# Patient Record
Sex: FEMALE | Race: WHITE | Employment: UNEMPLOYED | URBAN - METROPOLITAN AREA
[De-identification: names, ages, dates, MRNs, and addresses within clinical notes are randomized per-mention and may not be internally consistent; named-entity substitution may affect disease eponyms.]

---

## 2017-10-11 ENCOUNTER — HOSPITAL ENCOUNTER (EMERGENCY)
Age: 27
Discharge: HOME | End: 2017-10-11
Attending: FAMILY MEDICINE

## 2017-10-11 VITALS
HEIGHT: 65 IN | DIASTOLIC BLOOD PRESSURE: 69 MMHG | BODY MASS INDEX: 25.66 KG/M2 | RESPIRATION RATE: 16 BRPM | HEART RATE: 83 BPM | SYSTOLIC BLOOD PRESSURE: 117 MMHG | OXYGEN SATURATION: 100 % | TEMPERATURE: 98.2 F | WEIGHT: 154 LBS

## 2017-10-11 DIAGNOSIS — J20.8 ACUTE VIRAL BRONCHITIS: Primary | ICD-10-CM

## 2017-10-11 RX ORDER — PREDNISONE 10 MG/1
TABLET ORAL
Qty: 21 TAB | Refills: 0 | Status: SHIPPED | OUTPATIENT
Start: 2017-10-11 | End: 2017-12-02

## 2017-10-11 RX ORDER — CODEINE PHOSPHATE AND GUAIFENESIN 10; 100 MG/5ML; MG/5ML
5 SOLUTION ORAL
Qty: 120 ML | Refills: 0 | Status: SHIPPED | OUTPATIENT
Start: 2017-10-11 | End: 2017-12-02

## 2017-10-11 RX ORDER — ALBUTEROL SULFATE 90 UG/1
2 AEROSOL, METERED RESPIRATORY (INHALATION)
Qty: 1 INHALER | Refills: 0 | Status: SHIPPED | OUTPATIENT
Start: 2017-10-11 | End: 2018-03-27

## 2017-10-11 RX ORDER — BENZONATATE 200 MG/1
200 CAPSULE ORAL
Qty: 21 CAP | Refills: 0 | Status: SHIPPED | OUTPATIENT
Start: 2017-10-11 | End: 2017-10-18

## 2017-10-11 NOTE — UC PROVIDER NOTE
Patient is a 32 y.o. female presenting with cough. The history is provided by the patient. Cough   This is a new problem. The current episode started more than 1 week ago. The problem occurs every few minutes. The problem has not changed since onset. The cough is non-productive (increases on talking and worst at bed time). There has been no fever. Pertinent negatives include no chest pain, no chills, no shortness of breath and no wheezing. She has tried nothing for the symptoms. She is not a smoker. Her past medical history is significant for bronchitis. Her past medical history does not include asthma. History reviewed. No pertinent past medical history. Past Surgical History:   Procedure Laterality Date    HX APPENDECTOMY           History reviewed. No pertinent family history. Social History     Social History    Marital status:      Spouse name: N/A    Number of children: N/A    Years of education: N/A     Occupational History    Not on file. Social History Main Topics    Smoking status: Never Smoker    Smokeless tobacco: Never Used    Alcohol use Not on file    Drug use: Not on file    Sexual activity: Not on file     Other Topics Concern    Not on file     Social History Narrative    No narrative on file                ALLERGIES: Review of patient's allergies indicates no known allergies. Review of Systems   Constitutional: Negative for chills. Respiratory: Positive for cough. Negative for shortness of breath and wheezing. Cardiovascular: Negative for chest pain. All other systems reviewed and are negative. Vitals:    10/11/17 1728   BP: 117/69   Pulse: 83   Resp: 16   Temp: 98.2 °F (36.8 °C)   SpO2: 100%   Weight: 69.9 kg (154 lb)   Height: 5' 4.96\" (1.65 m)       Physical Exam   Constitutional: No distress.    HENT:   Right Ear: Tympanic membrane and ear canal normal.   Left Ear: Tympanic membrane and ear canal normal.   Nose: Nose normal.   Mouth/Throat: No oropharyngeal exudate, posterior oropharyngeal edema or posterior oropharyngeal erythema. Eyes: Conjunctivae are normal. Right eye exhibits no discharge. Left eye exhibits no discharge. Neck: Neck supple. Pulmonary/Chest: Effort normal. No respiratory distress. She has decreased breath sounds (bronchial ). She has no wheezes. She has no rales. Lymphadenopathy:     She has no cervical adenopathy. Skin: No rash noted. Nursing note and vitals reviewed. MDM     Differential Diagnosis; Clinical Impression; Plan:     CLINICAL IMPRESSION:  Acute viral bronchitis  (primary encounter diagnosis)      DDX    Plan:    Albuterol- tessalon- prednisone- zyrtec and robituss AC  Fluids  follow in 1-2 weeks  Amount and/or Complexity of Data Reviewed:    Review and summarize past medical records:  Yes  Risk of Significant Complications, Morbidity, and/or Mortality:   Presenting problems: Moderate  Management options:   Moderate  Progress:   Patient progress:  Stable      Procedures

## 2017-10-11 NOTE — LETTER
[facility] 
[address] [place] [address] 
[phone] Work/School Note Date: 10/11/2017 To Whom It May concern: 
 
Kylee Gilbert was seen and treated today in the urgent care center by the following provider(s): 
Attending Provider: Dwayne Vaz MD.   
 
Kylee Gilbert may return to work on 10/13/17. Sincerely, Dwayne Vaz MD

## 2017-10-11 NOTE — DISCHARGE INSTRUCTIONS
Bronchitis: Care Instructions  Your Care Instructions    Bronchitis is inflammation of the bronchial tubes, which carry air to the lungs. The tubes swell and produce mucus, or phlegm. The mucus and inflamed bronchial tubes make you cough. You may have trouble breathing. Most cases of bronchitis are caused by viruses like those that cause colds. Antibiotics usually do not help and they may be harmful. Bronchitis usually develops rapidly and lasts about 2 to 3 weeks in otherwise healthy people. Follow-up care is a key part of your treatment and safety. Be sure to make and go to all appointments, and call your doctor if you are having problems. It's also a good idea to know your test results and keep a list of the medicines you take. How can you care for yourself at home? · Take all medicines exactly as prescribed. Call your doctor if you think you are having a problem with your medicine. · Get some extra rest.  · Take an over-the-counter pain medicine, such as acetaminophen (Tylenol), ibuprofen (Advil, Motrin), or naproxen (Aleve) to reduce fever and relieve body aches. Read and follow all instructions on the label. · Do not take two or more pain medicines at the same time unless the doctor told you to. Many pain medicines have acetaminophen, which is Tylenol. Too much acetaminophen (Tylenol) can be harmful. · Take an over-the-counter cough medicine that contains dextromethorphan to help quiet a dry, hacking cough so that you can sleep. Avoid cough medicines that have more than one active ingredient. Read and follow all instructions on the label. · Breathe moist air from a humidifier, hot shower, or sink filled with hot water. The heat and moisture will thin mucus so you can cough it out. · Do not smoke. Smoking can make bronchitis worse. If you need help quitting, talk to your doctor about stop-smoking programs and medicines. These can increase your chances of quitting for good.   When should you call for help? Call 911 anytime you think you may need emergency care. For example, call if:  · You have severe trouble breathing. Call your doctor now or seek immediate medical care if:  · You have new or worse trouble breathing. · You cough up dark brown or bloody mucus (sputum). · You have a new or higher fever. · You have a new rash. Watch closely for changes in your health, and be sure to contact your doctor if:  · You cough more deeply or more often, especially if you notice more mucus or a change in the color of your mucus. · You are not getting better as expected. Where can you learn more? Go to http://tj-michele.info/. Enter H333 in the search box to learn more about \"Bronchitis: Care Instructions. \"  Current as of: March 25, 2017  Content Version: 11.3  © 3325-5236 [employer]. Care instructions adapted under license by [employer] (which disclaims liability or warranty for this information). If you have questions about a medical condition or this instruction, always ask your healthcare professional. [address] any warranty or liability for your use of this information.

## 2017-12-02 ENCOUNTER — HOSPITAL ENCOUNTER (EMERGENCY)
Age: 27
Discharge: HOME | End: 2017-12-02
Attending: PHYSICIAN ASSISTANT

## 2017-12-02 ENCOUNTER — APPOINTMENT (OUTPATIENT)
Dept: GENERAL RADIOLOGY | Age: 27
End: 2017-12-02
Attending: EMERGENCY MEDICINE

## 2017-12-02 VITALS
TEMPERATURE: 97.7 F | WEIGHT: 157 LBS | HEIGHT: 66 IN | OXYGEN SATURATION: 98 % | HEART RATE: 78 BPM | RESPIRATION RATE: 16 BRPM | BODY MASS INDEX: 25.23 KG/M2

## 2017-12-02 DIAGNOSIS — M25.572 ACUTE LEFT ANKLE PAIN: Primary | ICD-10-CM

## 2017-12-02 RX ORDER — IBUPROFEN 400 MG/1
400 TABLET ORAL
Qty: 20 TAB | Refills: 0 | Status: SHIPPED | OUTPATIENT
Start: 2017-12-02

## 2017-12-02 NOTE — UC PROVIDER NOTE
Patient is a 32 y.o. female presenting with foot pain. The history is provided by the patient (pain with prolonged standing and walking for 4-6 months,  No injury. Somtimes it swells but not much.). No  was used. Foot Pain    The current episode started more than 1 week ago. The problem occurs constantly. The problem has not changed since onset. The pain is present in the left ankle. The quality of the pain is described as aching. The pain is moderate. Associated symptoms include stiffness. Pertinent negatives include no numbness and full range of motion. The symptoms are aggravated by standing. She has tried nothing for the symptoms. There has been no history of extremity trauma. History reviewed. No pertinent past medical history. Past Surgical History:   Procedure Laterality Date    HX APPENDECTOMY           History reviewed. No pertinent family history. Social History     Social History    Marital status:      Spouse name: N/A    Number of children: N/A    Years of education: N/A     Occupational History    Not on file. Social History Main Topics    Smoking status: Never Smoker    Smokeless tobacco: Never Used    Alcohol use Not on file    Drug use: Not on file    Sexual activity: Not on file     Other Topics Concern    Not on file     Social History Narrative                ALLERGIES: Review of patient's allergies indicates no known allergies. Review of Systems   Constitutional: Negative. Musculoskeletal: Positive for stiffness. Left ankle pain   Skin: Negative. Neurological: Negative for numbness. Hematological: Negative. Vitals:    12/02/17 1406   Pulse: 78   Resp: 16   Temp: 97.7 °F (36.5 °C)   SpO2: 98%   Weight: 71.2 kg (157 lb)   Height: 5' 6\" (1.676 m)       Physical Exam   Constitutional: She is oriented to person, place, and time. She appears well-developed and well-nourished.    HENT:   Head: Normocephalic and atraumatic. Mouth/Throat: Oropharynx is clear and moist.   Eyes: Conjunctivae and EOM are normal.   Musculoskeletal: Normal range of motion. She exhibits tenderness. She exhibits no edema or deformity. Left ankle: She exhibits normal range of motion, no swelling and no deformity. Tenderness. No lateral malleolus, no medial malleolus, no head of 5th metatarsal and no proximal fibula tenderness found. Achilles tendon normal. Achilles tendon exhibits normal Lopez's test results. Feet:    NVI with palpable distal pedal pulse. Prompt cap refill   Neurological: She is alert and oriented to person, place, and time. Skin: Skin is warm and dry. No rash noted. No erythema. Psychiatric: She has a normal mood and affect. Her behavior is normal. Thought content normal.   Nursing note and vitals reviewed. MDM     Differential Diagnosis; Clinical Impression; Plan:     CLINICAL IMPRESSION:  Acute left ankle pain  (primary encounter diagnosis)    Plan:  1. NSAID  2. Close fu  3. Amount and/or Complexity of Data Reviewed:   Tests in the radiology section of CPT®:  Ordered and reviewed  Risk of Significant Complications, Morbidity, and/or Mortality:   Presenting problems: Moderate  Management options:   Moderate  Progress:   Patient progress:  Stable      Procedures

## 2017-12-02 NOTE — DISCHARGE INSTRUCTIONS
Ãáã ÈÇáÞÏã: ÅÑÔÇÏÇÊ ÇáÑÚÇíÉ  [ Foot Pain: Care Instructions ]  ÅÑÔÇÏÇÊ ÇáÑÚÇíÉ ÇáÎÇÕÉ Èß  Åä ÅÕÇÈÇÊ ÇáÞÏã ÇáÊí ÊõÓÈÈ Ãáã æÊæÑã ÅÕÇÈÇÊ ÔÇÆÚÉ ÊãÇãðÇ. íãßä ÊÞÑíÈðÇ áÌãíÚ ÇáÃáÚÇÈ ÇáÑíÇÖíÉ Ãæ ãÔÇÑíÚ ÇáÅÕáÇÍ ÇáãäÒáíÉ Ãä ÊÊÓÈÈ Ýí ÍÏæË ÒáÉ ÊäÊåí DRUFXIB ÈÃáã ÈÇáÞÏã. ßãÇ íãßä XZTYMYKNO ÇáÚÇÏí¡ ÎÇÕÉ ßáãÇ ÊÞÏãÊ Ýí ÇáÚãÑ¡ Ãä íÓÈÈ Ãáã ÈÇáÞÏã. ÓÊÔÝì ãÚÙã ÅÕÇÈÇÊ ÇáÞÏã ÇáÕÛíÑÉ ãä ÊáÞÇÁ äÝÓåÇ¡ æíßæä ÇáÚáÇÌ ÇáãäÒáí ÚÇÏÉ åæ ÝÞØ ßá ãÇ Úáíß ÝÚáå. ÅÐÇ ÃÕÈÊ ÅÕÇÈÉ ÍÇÏÉ¡ ÝÞÏ ÊÍÊÇÌ áÅÌÑÇÁ ÝÍæÕÇÊ æÚáÇÌ. ÊõÚÏ ÑÚÇíÉ ÇáãÊÇÈÚÉ ÌÒÁðÇ ÃÓÇÓíðÇ Ýí ÚáÇÌß æÓáÇãÊß. ÝÚáíß ÇáÍÑÕ Úáì ÊÑÊíÈ ÌãíÚ ãæÇÚíÏ ÒíÇÑÉ ÇáØÈíÈ URDFVVUWA ÈåÇ¡ EKYTEZBT ÈØÈíÈß ÚäÏ GKGQLBVK ãä Ãí ãÔßáÇÊ. æãä ÇáÌíÏ ÃíÖðÇ Ãä ÊÚÑÝ äÊÇÆÌ VJUOEHZL æßÐáß VJTPRMSO ÈÞÇÆãÉ ÇáÃÏæíÉ ÇáÊí NGVGUCGU. ßíÝ íãßäß ÇáÇÚÊäÇÁ ÈäÝÓß Ýí ADXPSL¿   · ÊäÇæá ÃÏæíÉ ÊÓßíä ÇáÃáã ÈÏÞÉ æÝÞðÇ áÅÑÔÇÏÇÊ ÇáØÈíÈ. o o ÅÐÇ ÃÚØÇß ÇáØÈíÈ ÏæÇÁð ãä ÇáÃÏæíÉ ÇáÊí ÊõÕÑÝ ÈæÕÝÉ ØÈíÉ áÚáÇÌ ÇáÃáã¡ NDYSTSC æÝÞ ÅÑÔÇÏÇÊå. o o ÅÐÇ áã ÊÊäÇæá ÏæÇÁð áÚáÇÌ ÇáÃáã ãä ÇáÃÏæíÉ ÇáÊí ÊõÕÑÝ ÈæÕÝÉ ØÈíÉ¡ ÝÇÓÃá ØÈíÈß ÅÐÇ ßÇä ÈÅãßÇäß ÊäÇæá ÏæÇÁ ãä ÇáÃÏæíÉ ÇáÊí ÊõÕÑÝ ÈÏæä æÕÝÉ ØÈíÉ.  · ÇÓÊÑÍ æÇÍãí ÞÏãß. ÎÐ ÞÓØðÇ ãä ÇáÑÇÍÉ ãä Ãí äÔÇØ ÞÏ íÊÓÈÈ Ýí ÇáÔÚæÑ ÈÃáã.  · íãßäßö æÖÚ ËáÌ Ãæ ßãÇÏÉ ÈÇÑÏÉ Úáì ÞÏãß áãÏÉ ÊÊÑÇæÍ ãä 10 ÏÞÇÆÞ Åáì 20 ÏÞíÞÉ Ýí ÇáãÑÉ ÇáæÇÍÏÉ. Henretta Aubrey ÞãÇÔ ÑÞíÞÉ Èíä ÇáËáÌ æÌáÏß.  · ÝÃÓäÏ ÇáÞÏã SJPJMGJEP Úáì æÓÇÏÉ Ýí Ãí æÞÊ ÊÌáÓ Ãæ ÊÑÞÏ Ýíå ÎáÇá VJZWCLW ÃíÇã ÇáÞÇÏãÉ. [place] [employer] ÅÈÞÇÁ ÇáãäØÞÉ PYULICQJV Ýí ãÓÊæì ÃÚáì ãä ãÓÊæì ÇáÞáÈ. ÝåÐÇ ãä ÔÃäå Ãä íÓÇÚÏ Úáì ÊÞáíá ÇáÊæÑã.  · ÞÏ íæÕí ØÈíÈß ÈÃä ÊáÝ ÞÏãß ÈÖãÇÏÉ ãÑäÉ. ÇÈÞö Úáì ÞÏãß ãáÝæÝÉ áÝÊÑÉ ØÇáãÇ æÕÝåÇ ÇáØÈíÈ.  · ÅÐÇ ÃæÕì ÇáØÈíÈ ÈÇÓÊÎÏÇã ÚßÇßíÒ¡ STOZRRDUV æÝÞðÇ ááÊæÌíåÇÊ.  · ÇÑÊÏí ÍÐÇÁ æÇÓÚ.  · Ýí ÃÞÑÈ æÞÊ íäÊåí ÇáÃáã æÇáæÑã¡ ÇÈÏÁ Ýí ããÇÑÓÉ ÊãÇÑíä ÑíÇÖíÉ ÎÝíÝÉ áÞÏãß. íãßä Ãä íÎÈÑß ØÈíÈß Ãí ÇáÊãÇÑíä ÇáÑíÇÖíÉ ÇáãÝíÏÉ áß. ãÊì íäÈÛí áß XZWIOET áØáÈ ÇáãÓÇÚÏÉ¿  íÊã LAJHAOL ÈÇáÑÞã 911 ÚäÏ ÇáÇÚÊÞÇÏ Ãäß ÈÍÇÌÉ Åáì ÑÚÇíÉ ØÇÑÆÉ.  Úáì ÓÈíá RLBCOA¡ ÇÊÕá Ýí FKXVDEJ ÇáÊÇáíÉ:   · ÊÍæá áæä ÞÏãß ááæä ÔÇÍÈ Ãæ ÇÈíÖ Ãæ ÃÒÑÞ Ãæ LWNSW ÈÇÑÏÉ. Úáíß ÇáÇÊÕÇá ÈØÈíÈß Úáì ÇáÝæÑ Ãæ ØáÈ ÑÚÇíÉ ØÈíÉ ÝæÑíÉ Ýí YUYUUVD ÇáÊÇáíÉ:   · áÇ íãßäß ÇáÊÍÑß ÈÞÏãß Ãæ ÇáæÞæÝ ÚáíåÇ.  · ÊÈÏæ ÞÏãß ãáÊæíÉ Ãæ ÎÑÌÊ ãä æÖÚåÇ ÇáØÈíÚí.  · áÇ Êßæä ÞÏãß ãÓÊÞÑÉ ÚäÏ ÇáäÒæá.  · ÙåæÑ ÚáÇãÇÊ ÚÏæì Úáíß¡ ãËá:   o o Ãáã ãÊÒÇíÏ Ãæ ÊæÑã Ãæ ÓÎæäÉ Ãæ ÇÍãÑÇÑ. o o ÅÐÇ ÎÑÌÊ ÎØæØ ÍãÑÇÁ ãä ÇáãäØÞÉ RALJVOGK.  o o ÕÏíÏ íÎÑÌ ãä ãßÇä ÈÇáÞÏã. o o ÅÐÇ ÃÕÈÊ MSFBYFM.  · ÊÔÚÑ ÈÇáÎÏÑ Ãæ æÎÒ ÈÞÏãß. Úáíß ãÑÇÞÈÉ Ãí ÊÛíÑÇÊ ÊØÑÃ Úáì ÕÍÊß ÌíÏðÇ¡ æÇáÍÑÕ Úáì ÇáÇÊÕÇá ÈÇáØÈíÈ Ýí HDDFVIV ÇáÊÇáíÉ:   · ÅÐÇ áã ÊÊÍÓä ÍÇáÊß ßãÇ åæ ãÊæÞÚ.  · áÏíß ßÏãÇÊ ãä ÅÕÇÈÉ ÇÓÊãÑÊ ÃßËÑ ãä ÃÓÈæÚíä. Ãíä íãßä ãÚÑÝÉ ÇáãÒíÏ¿  ÇäÊÞÇá Åáì [email or web address] ÏÎæá D999 íãßäß ãÚÑÝÉ ÇáãÒíÏ ãä ÎáÇá ãÑÈÚ ÇáÈÍË \"Ãáã ÈÇáÞÏã: ÅÑÔÇÏÇÊ ÇáÑÚÇíÉ - [ Foot Pain: Care Instructions ]. \"  © 0748-8225 [employer]. ÊãÊ ÊåíÆÉ ÅÑÔÇÏÇÊ ÇáÚäÇíÉ ÈãæÌÈ ÊÑÎíÕ ãä ãÎÊÕ ÇáÑÚÇíÉ ÇáÕÍíÉ áÏíß. ÅÐÇ ßÇäÊ áÏíß ÃíÉ CLQOCUHMW Úä ÍÇáÉ ØÈíÉ Ãæ Ãí ãä åÐå RSWYXSYTL¡ ÝÊæÌå ÏæãðÇ UDCESSQ Åáì ãÎÊÕ ÇáÑÚÇíÉ ÇáÕÍíÉ. ÊäÝí ãäÙãÉ Wauwaa Nati Garcia ÖãÇä Ãæ Georgeana Blinks XKKTYWY åÐå EESMSOMNQ. ÅÕÏÇÑ ÇáãÍÊæì: 11.4 ãÍÏøË ECLNYBAJ ãä: 22 IPHZR ATIYEAT 9203      ÇáÅÌåÇÏ ÇáÚÖáí: ÅÑÔÇÏÇÊ ÇáÑÚÇíÉ  [ Muscle Strain: Care Instructions ]  ÅÑÔÇÏÇÊ ÇáÑÚÇíÉ ÇáÎÇÕÉ Èß  íÍÏË ÇáÅÌåÇÏ ÇáÚÖáí ÚäÏãÇ ÊÑåÞ Ãæ ÊÓÍÈ ÃÍÏ ÇáÚÖáÇÊ. íãßä Ãä íÍÏË Ðáß ÚäÏãÇ ÊãÇÑÓ ÇáÊãÇÑíä ÇáÑíÇÖíÉ Ãæ ÊÑÝÚ ÔíÆðÇ Ãæ ÚäÏãÇ ÊÊÚÑÖ áÍÇÏË. íãßä Ãä ÊÓÇÚÏ ÇáÑÇÍÉ æÇáÑÚÇíÉ ÇáãäÒáíÉ ÇáÃÎÑì Ýí ÔÝÇÁ ÇáÚÖáÉ. ÊõÚÏ ÑÚÇíÉ ÇáãÊÇÈÚÉ ÌÒÁðÇ ÃÓÇÓíðÇ Ýí ÚáÇÌß æÓáÇãÊß. ÝÚáíß ÇáÍÑÕ Úáì ÊÑÊíÈ ÌãíÚ ãæÇÚíÏ ÒíÇÑÉ ÇáØÈíÈ MDAREAPNB ÈåÇ¡ MVMIESZX ÈØÈíÈß ÚäÏ VKJAZNVT ãä Ãí ãÔßáÇÊ. æãä ÇáÌíÏ ÃíÖðÇ Ãä ÊÚÑÝ äÊÇÆÌ AUCCNNVT æßÐáß TOODWNMZ ÈÞÇÆãÉ ÇáÃÏæíÉ ÇáÊí XKLMVLDB. ßíÝ íãßäß ÇáÇÚÊäÇÁ ÈäÝÓß Ýí UKQIBV¿   · ÇÚãá Úáì ÇÓÊÑÎÇÁ XGENKB ÇáãÌåÏÉ. áÇ ÊÖÚ ÚáíåÇ ÔíÆðÇ ËÞíáðÇ áíæã Ãæ ÇËäíä. ÇÓÊÎÏã ÚßÇÒÇÊ Ãæ ãÚáÇÞ áÅÑÇÍÉ ÇáÃØÑÇÝ FXTMDIAE ÅÐÇ äÕÍß ØÈíÈß ÈÐáß.    · íãßäßö æÖÚ ËáÌ Ãæ ßãÇÏÉ ÈÇÑÏÉ Úáì VKBFAA ÇáãáÊåÈÉ áãÏÉ ÊÊÑÇæÍ ãä 10 ÏÞÇÆÞ Åáì 20 ÏÞíÞÉ Ýí ÇáãÑÉ ÇáæÇÍÏÉ áÅíÞÇÝ ÇáÊæÑã. ÖÚ ÞØÚÉ ÞãÇÔ ÑÞíÞÉ Èíä ßãÇÏÉ ÇáËáÌ æÌáÏß.  · Þã ÈÓäÏ ÇáÐÑÇÚ Ãæ ÇáÓÇÞ ÇáãáÊåÈ Úáì æÓÇÏÉ ÚäÏãÇ ÊÞæã ÈÚãá ßãÇÏÇÊ ËáÌ Ãæ Ýí Ãí æÞÊ Êßæä VDWXXI Ãæ EWPIXW ÎáÇá PHPPTMH ÃíÇã ÇáÊÇáíÉ. Ag Ed ÅÈÞÇÁ ÇáãäØÞÉ LCQCKDSKJ Ýí ãÓÊæì ÃÚáì ãä ãÓÊæì ÇáÞáÈ. ÝåÐÇ ãä ÔÃäå Ãä íÓÇÚÏ Úáì ÊÞáíá ÇáÊæÑã.  · ÊäÇæá ÃÏæíÉ ÊÓßíä ÇáÃáã ÈÏÞÉ æÝÞðÇ áÅÑÔÇÏÇÊ ÇáØÈíÈ. o o ÅÐÇ ÃÚØÇß ÇáØÈíÈ ÏæÇÁð ãä ÇáÃÏæíÉ ÇáÊí ÊõÕÑÝ ÈæÕÝÉ ØÈíÉ áÚáÇÌ ÇáÃáã¡ FZJGBVS æÝÞ ÅÑÔÇÏÇÊå. o o ÅÐÇ áã ÊÊäÇæá ÏæÇÁð áÚáÇÌ ÇáÃáã ãä ÇáÃÏæíÉ ÇáÊí ÊõÕÑÝ ÈæÕÝÉ ØÈíÉ¡ ÝÇÓÃá ØÈíÈß ÅÐÇ ßÇä ÈÅãßÇäß ÊäÇæá ÏæÇÁ ãä ÇáÃÏæíÉ ÇáÊí ÊõÕÑÝ ÈÏæä æÕÝÉ ØÈíÉ.  · ßãÇ íäÈÛí ÊÌäøÈ ããÇÑÓÉ Ãíø ÔíÁ íÒíÏ ãä ÊÝÇÞã ÇáÃáã. ÚÇæÏ ããÇÑÓÉ ÇáÊãÇÑíä ÈÇáÊÏÑíÌ ßáãÇ ÊÔÚÑ ÈÇáÊÍÓä. ãÊì íäÈÛí áß DUQOKNT áØáÈ ÇáãÓÇÚÏÉ¿  Úáíß ÇáÇÊÕÇá ÈØÈíÈß Úáì ÇáÝæÑ Ãæ ØáÈ ÑÚÇíÉ ØÈíÉ ÝæÑíÉ Ýí ÇáÍÇáÇÊ ÇáÊÇáíÉ:   · ÅÐÇ ÃÕÈÊ ÈÃáã ÌÏíÏ æÍÇÏ Ýí ÇáÈØä.  · ÅÐÇ ÃÕÈÍ ÇáØÑÝ ÇáãÕÇÈ ÈÇÑÏðÇ Ãæ ÔÇÍÈðÇ Ãæ ÊÛíÑ áæäå.  · ÅÐÇ ßäÊ ÊÚÇäí ãä Êäãíá¡ Ãæ ÖÚÝ¡ Ãæ ÊÎÏÑ Ýí ÇáØÑÝ ÇáãÕÇÈ.  · ÅÐÇ áã íßä ÈÅãßÇä ÊÍÑíß ÇáãäØÞÉ ÇáãÕÇÈÉ. Úáíß ãÑÇÞÈÉ Ãí ÊÛíÑÇÊ ÊØÑÃ Úáì ÕÍÊß ÌíÏðÇ¡ æÇáÍÑÕ Úáì ÇáÇÊÕÇá ÈÇáØÈíÈ Ýí SCFJCAC ÇáÊÇáíÉ:   · ÅÐÇ áã íßä ÈÅãßÇäß ÇáÊÍãíá Úáì ÃÍÏ ONXQUHC¡ Ãæ ÊÔÚÑ Ãäå ÛíÑ ãÓÊÞÑ ÚäÏ ÇáÓíÑ.  · ÅÐÇ ÇÒÏÇÏ ÇáÃáã æÇáÊæÑã Ãæ áã íÈÏÁÇ Ýí ÇáÊÍÓä ÈÚÏ íæãíä ãä ÇáÑÚÇíÉ ÇáãäÒáíÉ. Ãíä íãßä ãÚÑÝÉ ÇáãÒíÏ¿  ÇäÊÞÇá Åáì [email or web address] ÏÎæá Colmillo.[place]onia íãßäß ãÚÑÝÉ ÇáãÒíÏ ãä ZBBM ãÑÈÚ ÇáÈÍË \"ÇáÅÌåÇÏ ÇáÚÖáí: ÅÑÔÇÏÇÊ ÇáÑÚÇíÉ - [ Muscle Strain: Care Instructions ]. \"  © 3830-0574 Healthwise, [employer]. ÊãÊ ÊåíÆÉ ÅÑÔÇÏÇÊ ÇáÚäÇíÉ ÈãæÌÈ ÊÑÎíÕ ãä ãÎÊÕ ÇáÑÚÇíÉ ÇáÕÍíÉ áÏíß. ÅÐÇ ßÇäÊ áÏíß ÃíÉ GAIKVOXBG Úä ÍÇáÉ ØÈíÉ Ãæ Ãí ãä åÐå JSFVOCZBU¡ ÝÊæÌå ÏæãðÇ IXEOLOS Åáì ãÎÊÕ ÇáÑÚÇíÉ ÇáÕÍíÉ. ÊäÝí ãäÙãÉ [employer], [employer] ÖãÇä Ãæ Laurann Cost NNPPENK åÐå [id number].   ÅÕÏÇÑ ÇáãÍÊæì: 11.4 ãÍÏøË GULCOHCE ãä: 22 KJIHY [id number]

## 2018-03-27 ENCOUNTER — OFFICE VISIT (OUTPATIENT)
Dept: INTERNAL MEDICINE CLINIC | Age: 28
End: 2018-03-27

## 2018-03-27 VITALS
RESPIRATION RATE: 22 BRPM | HEART RATE: 70 BPM | TEMPERATURE: 98 F | SYSTOLIC BLOOD PRESSURE: 91 MMHG | WEIGHT: 156 LBS | DIASTOLIC BLOOD PRESSURE: 58 MMHG | BODY MASS INDEX: 25.07 KG/M2 | HEIGHT: 66 IN | OXYGEN SATURATION: 98 %

## 2018-03-27 DIAGNOSIS — M25.572 CHRONIC PAIN OF LEFT ANKLE: Primary | ICD-10-CM

## 2018-03-27 DIAGNOSIS — G89.29 CHRONIC PAIN OF LEFT ANKLE: Primary | ICD-10-CM

## 2018-03-27 NOTE — PROGRESS NOTES
Rm#9   Chief Complaint   Patient presents with   [address] [place] [address]     new pt     Ankle Pain     left ankle pain; takes ibuprofen. x1 year      1. Have you been to the ER, urgent care clinic since your last visit? Hospitalized since your last visit? Yes  [facility]    2. Have you seen or consulted any other health care providers outside of the [address] [place] [address] since your last visit? Include any pap smears or colon screening.  Yes lst primary care  was in [address] Maintenance Due   Topic Date Due    DTaP/Tdap/Td series (1 - Tdap) 02/12/2011    PAP AKA CERVICAL CYTOLOGY  02/12/2011    Influenza Age 9 to Adult  08/01/2017     PHQ over the last two weeks 3/27/2018   Little interest or pleasure in doing things Not at all   Feeling down, depressed or hopeless Not at all   Total Score PHQ 2 0     Learning Assessment 3/27/2018   PRIMARY LEARNER Patient   HIGHEST LEVEL OF EDUCATION - PRIMARY LEARNER  4 YEARS OF COLLEGE   BARRIERS PRIMARY LEARNER NONE   CO-LEARNER CAREGIVER No   PRIMARY LANGUAGE OTHER (COMMENT)   LEARNER PREFERENCE PRIMARY DEMONSTRATION   ANSWERED BY self   RELATIONSHIP SELF

## 2018-03-27 NOTE — PROGRESS NOTES
HISTORY OF PRESENT ILLNESS  Roiso Guillory is a 29 y.o. female as a new patient for evaluation of the following  HPI  Left medial ankle pain for 1 year, getting worse. Denies injury or unusual activity. Pain worse with prolong standing and sitting. Evaluated by providers in [address] [place] [address],[address] and native [place]; no abnormal findings,xrays normal     Ibuprofen effective for pain    2 childen, . 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 LNMP February 25. No recent gyn exam or gyn provider    History reviewed. No pertinent past medical history. Past Surgical History:   Procedure Laterality Date    HX APPENDECTOMY         Current Outpatient Prescriptions on File Prior to Visit   Medication Sig Dispense Refill    ibuprofen (MOTRIN) 400 mg tablet Take 1 Tab by mouth every six (6) hours as needed for Pain. 20 Tab 0     No current facility-administered medications on file prior to visit. Family History   Problem Relation Age of Onset    No Known Problems Mother     No Known Problems Father            Review of Systems   Constitutional: Negative. HENT: Negative. Eyes: Negative. Respiratory: Negative. Cardiovascular: Negative. Gastrointestinal: Negative. Genitourinary: Negative. Musculoskeletal: Positive for joint pain. Negative for back pain, falls, myalgias and neck pain. Skin: Negative. Neurological: Negative. Endo/Heme/Allergies: Negative. Negative for environmental allergies. Psychiatric/Behavioral: Negative. Visit Vitals    BP 91/58 (BP 1 Location: Left arm, BP Patient Position: Sitting)    Pulse 70    Temp 98 °F (36.7 °C) (Oral)    Resp 22    Ht 5' 6\" (1.676 m)    Wt 156 lb (70.8 kg)    LMP 02/25/2018 (Exact Date)    SpO2 98%    BMI 25.18 kg/m2     Physical Exam   Constitutional: She is oriented to person, place, and time.  She appears well-developed and well-nourished. No distress. HENT:   Head: Normocephalic and atraumatic. Right Ear: External ear normal.   Left Ear: External ear normal.   Nose: Nose normal.   Mouth/Throat: Oropharynx is clear and moist. No oropharyngeal exudate. Eyes: Conjunctivae are normal. Right eye exhibits no discharge. Left eye exhibits no discharge. Neck: Normal range of motion. Neck supple. No thyromegaly present. Cardiovascular: Normal rate and regular rhythm. Pulmonary/Chest: Effort normal and breath sounds normal.   Abdominal: Soft. Musculoskeletal: She exhibits no edema or deformity. Left ankle: She exhibits decreased range of motion. She exhibits no swelling and no deformity. Tenderness. Medial malleolus tenderness found. Achilles tendon exhibits no pain. Feet:    Lymphadenopathy:     She has no cervical adenopathy. Neurological: She is alert and oriented to person, place, and time. Skin: Skin is warm and dry. No rash noted. She is not diaphoretic. No erythema. No pallor. Psychiatric: She has a normal mood and affect. Her behavior is normal. Judgment and thought content normal.       ASSESSMENT and PLAN    ICD-10-CM ICD-9-CM    1. Chronic pain of left ankle M25.572 719.47 REFERRAL TO PHYSICAL THERAPY    G89.29 338.29      Follow-up Disposition: Not on File  reviewed diet, exercise and weight control  cardiovascular risk and specific lipid/LDL goals reviewed  reviewed medications and side effects in detail    1. Encouraged to continue NSAID sparingly, wear supportive footwear    Patient voices understanding and acceptance of this advice and will call back if any further questions or concerns. An After Visit Summary was printed and given to the patient.

## 2018-03-27 NOTE — MR AVS SNAPSHOT
[address] [place] [address] [address] [place] [address] 
[phone] Patient: Helen Rajan MRN: [id number] HO Visit Information Date & Time Provider Department Dept. Phone Encounter #  
 3/27/2018  3:15 PM Meg[facility] [phone] [id number] Upcoming Health Maintenance Date Due DTaP/Tdap/Td series (1 - Tdap) 2011 PAP AKA CERVICAL CYTOLOGY 2011 Influenza Age 5 to Adult 2017 Allergies as of 3/27/2018  Review Complete On: 3/27/2018 By: Mary Parr NP No Known Allergies Current Immunizations  Never Reviewed No immunizations on file. Not reviewed this visit You Were Diagnosed With   
  
 Codes Comments Chronic pain of left ankle    -  Primary ICD-10-CM: M25.572, Z55.27 ICD-9-CM: 719.47, 338.29 Vitals BP Pulse Temp Resp Height(growth percentile) Weight(growth percentile) 91/58 (BP 1 Location: Left arm, BP Patient Position: Sitting) 70 98 °F (36.7 °C) (Oral) 22 5' 6\" (1.676 m) 156 lb (70.8 kg) LMP SpO2 BMI OB Status Smoking Status 2018 (Exact Date) 98% 25.18 kg/m2 Having regular periods Never Smoker Vitals History BMI and BSA Data Body Mass Index Body Surface Area  
 25.18 kg/m 2 1.82 m 2 Preferred Pharmacy Pharmacy Name Phone [id number] CHRISTEL Enrique/ [facility]- [facility] [phone] Your Updated Medication List  
  
   
This list is accurate as of 3/27/18  3:41 PM.  Always use your most recent med list.  
  
  
  
  
 ibuprofen 400 mg tablet Commonly known as:  MOTRIN Take 1 Tab by mouth every six (6) hours as needed for Pain. We Performed the Following REFERRAL TO PHYSICAL THERAPY [HYB41 Custom] Referral Information Referral ID Referred By Referred To [id number] Alysha Watermanle [id number] [address] [place] [address] Medicine and Physical Therapy [place], [address] [place] [address] Road Phone: [phone] Fax: [phone] Visits Status Start Date End Date 1 New Request 3/27/18 3/27/19 If your referral has a status of pending review or denied, additional information will be sent to support the outcome of this decision. Introducing [facility]! Skinny Bailey introduces [employer] patient portal. Now you can access parts of your medical record, email your doctor's office, and request medication refills online. 1. In your internet browser, go to [email or web address] 2. Click on the First Time User? Click Here link in the Sign In box. You will see the New Member Sign Up page. 3. Enter your Wavecraft Access Code exactly as it appears below. You will not need to use this code after youve completed the sign-up process. If you do not sign up before the expiration date, you must request a new code. · Wavecraft Access Code: [email or web address] Expires: 6/25/2018  3:27 PM 
 
4. Enter the last four digits of your Social Security Number (xxxx) and Date of Birth (mm/dd/yyyy) as indicated and click Submit. You will be taken to the next sign-up page. 5. Create a Wavecraft ID. This will be your Wavecraft login ID and cannot be changed, so think of one that is secure and easy to remember. 6. Create a Wavecraft password. You can change your password at any time. 7. Enter your Password Reset Question and Answer. This can be used at a later time if you forget your password. 8. Enter your e-mail address. You will receive e-mail notification when new information is available in [address] 9. Click Sign Up. You can now view and download portions of your medical record. 10. Click the Download Summary menu link to download a portable copy of your medical information. If you have questions, please visit the Frequently Asked Questions section of the Buzzmov[employer] website. Remember, [employer] is NOT to be used for urgent needs. For medical emergencies, dial 911. Now available from your iPhone and Android! Please provide this summary of care documentation to your next provider. Your primary care clinician is listed as NONE. If you have any questions after today's visit, please call [phone].

## 2019-11-11 NOTE — PROGRESS NOTES
RM 3    Chief Complaint   Patient presents with    Fever     chills, fatigue began sunday while in the hospital delivering her sone     Cough     dry cough at night      1. Have you been to the ER, urgent care clinic since your last visit? Hospitalized since your last visit? [facility] for delivery of baby     2. Have you seen or consulted any other health care providers outside of the [address] [place] [address] since your last visit? Include any pap smears or colon screening.  OBGYN Dr. Jeremías Nguyen Maintenance Due   Topic Date Due    DTaP/Tdap/Td series (1 - Tdap) 02/12/2011    PAP AKA CERVICAL CYTOLOGY  02/12/2011    Influenza Age 9 to Adult  08/01/2019     Patient declines vaccines today     Learning Assessment 3/27/2018   PRIMARY LEARNER Patient   HIGHEST LEVEL OF EDUCATION - PRIMARY LEARNER  4 YEARS OF COLLEGE   BARRIERS PRIMARY LEARNER NONE   CO-LEARNER CAREGIVER No   PRIMARY LANGUAGE OTHER (COMMENT)   LEARNER PREFERENCE PRIMARY DEMONSTRATION   ANSWERED BY self   RELATIONSHIP SELF

## 2019-11-12 ENCOUNTER — OFFICE VISIT (OUTPATIENT)
Dept: INTERNAL MEDICINE CLINIC | Age: 29
End: 2019-11-12

## 2019-11-12 VITALS
HEART RATE: 80 BPM | TEMPERATURE: 97.8 F | BODY MASS INDEX: 29.54 KG/M2 | RESPIRATION RATE: 17 BRPM | DIASTOLIC BLOOD PRESSURE: 69 MMHG | OXYGEN SATURATION: 97 % | HEIGHT: 66 IN | WEIGHT: 183.8 LBS | SYSTOLIC BLOOD PRESSURE: 105 MMHG

## 2019-11-12 DIAGNOSIS — R05.9 COUGH: ICD-10-CM

## 2019-11-12 DIAGNOSIS — R50.9 FEVER, UNSPECIFIED FEVER CAUSE: ICD-10-CM

## 2019-11-12 DIAGNOSIS — J06.9 VIRAL URI WITH COUGH: Primary | ICD-10-CM

## 2019-11-12 RX ORDER — IBUPROFEN 800 MG/1
TABLET ORAL
COMMUNITY
Start: 2019-11-10 | End: 2022-09-13

## 2019-11-12 RX ORDER — GUAIFENESIN 600 MG/1
600 TABLET, EXTENDED RELEASE ORAL 2 TIMES DAILY
Qty: 30 TAB | Refills: 0 | Status: SHIPPED | OUTPATIENT
Start: 2019-11-12 | End: 2022-09-13

## 2019-11-12 NOTE — PATIENT INSTRUCTIONS
Please contact Dr. Viridiana Sears. [facility]) if any temperature over 101F, increasing uterine(abdominal) pain, or other worsening. Continuing to breastfeed is the best protection for your baby. To prevent transmission of infection, be sure to wash your hands and work-surfaces frequently. Viral Respiratory Infection: Care Instructions  Your Care Instructions    Viruses are very small organisms. They grow in number after they enter your body. There are many types that cause different illnesses, such as colds and the mumps. The symptoms of a viral respiratory infection often start quickly. They include a fever, sore throat, and runny nose. You may also just not feel well. Or you may not want to eat much. Most viral respiratory infections are not serious. They usually get better with time and self-care. Antibiotics are not used to treat a viral infection. That's because antibiotics will not help cure a viral illness. In some cases, antiviral medicine can help your body fight a serious viral infection. Follow-up care is a key part of your treatment and safety. Be sure to make and go to all appointments, and call your doctor if you are having problems. It's also a good idea to know your test results and keep a list of the medicines you take. How can you care for yourself at home? · Rest as much as possible until you feel better. · Be safe with medicines. Take your medicine exactly as prescribed. Call your doctor if you think you are having a problem with your medicine. You will get more details on the specific medicine your doctor prescribes. · Take an over-the-counter pain medicine, such as acetaminophen (Tylenol), ibuprofen (Advil, Motrin), or naproxen (Aleve), as needed for pain and fever. Read and follow all instructions on the label. Do not give aspirin to anyone younger than 20. It has been linked to Reye syndrome, a serious illness.   · Drink plenty of fluids, enough so that your urine is light yellow or clear like water. Hot fluids, such as tea or soup, may help relieve congestion in your nose and throat. If you have kidney, heart, or liver disease and have to limit fluids, talk with your doctor before you increase the amount of fluids you drink. · Try to clear mucus from your lungs by breathing deeply and coughing. · Gargle with warm salt water once an hour. This can help reduce swelling and throat pain. Use 1 teaspoon of salt mixed in 1 cup of warm water. · Do not smoke or allow others to smoke around you. If you need help quitting, talk to your doctor about stop-smoking programs and medicines. These can increase your chances of quitting for good. To avoid spreading the virus  · Cough or sneeze into a tissue. Then throw the tissue away. · If you don't have a tissue, use your hand to cover your cough or sneeze. Then clean your hand. You can also cough into your sleeve. · Wash your hands often. Use soap and warm water. Wash for 15 to 20 seconds each time. · If you don't have soap and water near you, you can clean your hands with alcohol wipes or gel. When should you call for help? Call your doctor now or seek immediate medical care if:    · You have a new or higher fever.     · Your fever lasts more than 48 hours.     · You have trouble breathing.     · You have a fever with a stiff neck or a severe headache.     · You are sensitive to light.     · You feel very sleepy or confused.    Watch closely for changes in your health, and be sure to contact your doctor if:    · You do not get better as expected. Where can you learn more? Go to http://tj-michele.info/. Enter K542 in the search box to learn more about \"Viral Respiratory Infection: Care Instructions. \"  Current as of: June 9, 2019  Content Version: 12.2  © 5859-5749 [employer], IDRI ([employer]).  Care instructions adapted under license by [employer] (which disclaims liability or warranty for this information). If you have questions about a medical condition or this instruction, always ask your healthcare professional. [place] [address] any warranty or liability for your use of this information.

## 2019-11-12 NOTE — PROGRESS NOTES
ACUTE VISIT     HPI:   Nikolay Hernandez is a 34 y.o. female, she presents today for:   34year old woman 4 days post partum. Delievered of baby boy, . Since delivery has been feeling hot and cold. Denies abdominal pain. Coughing started  night. Feels a raudel in abdomen   Vaginal bleeding is not too heavy. Notes that her appettite is not good. But no Nausea, no vomitting. When she stands up she feels pain everywhere     ROS: no chills, no nausea, no vomiting, no diarrhea, no dizziness nor lightheadedness with standing. Medications used for acute illness: none    Current Outpatient Medications on File Prior to Visit   Medication Sig    ibuprofen (MOTRIN) 800 mg tablet     ibuprofen (MOTRIN) 400 mg tablet Take 1 Tab by mouth every six (6) hours as needed for Pain. No current facility-administered medications on file prior to visit. No Known Allergies    PMH/PSH/FH: reviewed and updated    Sochx:   reports that she has never smoked. She has never used smokeless tobacco. She reports that she does not drink alcohol or use drugs. PE:  Blood pressure 105/69, pulse 80, temperature 97.8 °F (36.6 °C), temperature source Oral, resp. rate 17, height 5' 6\" (1.676 m), weight 183 lb 12.8 oz (83.4 kg), last menstrual period 2019, SpO2 97 %. Body mass index is 29.67 kg/m². Physical Exam   Constitutional: She is oriented to person, place, and time. She appears well-developed and well-nourished. No distress. HENT:   Head: Normocephalic. Mouth/Throat: Oropharynx is clear and moist.   + congestion   Eyes: Conjunctivae and EOM are normal.   Neck: Neck supple. Cardiovascular: Normal rate, regular rhythm and normal heart sounds. Pulmonary/Chest: Effort normal and breath sounds normal. No respiratory distress. She has no wheezes. She has no rales. + dry cough   Abdominal: Soft. She exhibits no distension. Slight tenderness over uterus, but not painful.     Neurological: She is alert Routing refill request to provider for review/approval because:  Elsy given x1 and patient did not follow up, please advise    Norris Concepcion, RN, BSN                 and oriented to person, place, and time. Skin: Skin is warm and dry. Capillary refill takes less than 2 seconds. Psychiatric: She has a normal mood and affect. Nursing note and vitals reviewed. Labs:  No results found for any visits on 11/12/19. A/P  Felicia Guillory was seen today for had concerns including Fever (chills, fatigue began sunday while in the hospital delivering her sone ) and Cough (dry cough at night ). .  The diagnosis and plan was discussed including:        ICD-10-CM ICD-9-CM    1. Viral URI with cough J06.9 465.9 guaiFENesin ER (MUCINEX) 600 mg ER tablet    B97.89     2. Cough R05 786.2    3. Fever, unspecified fever cause R50.9 780.60    4. Lactating mother Z39.1 V24.1      Viral URI: Discussed supportive care including sleep, fluids, anti-pyretics. Advised to return if any signs of complications including: increased fever, new or worsening headache, change in breathing, or congestion last more than 10 days. Okay to use guaifenesin. Post-partum: with normal vitals and no recent anti-pyretics no sign of sepsis. Patient also without significant signs of uterine pain. Suspect this is a simple viral uri.    -reviewed taking temp at home. disucssed that if true fever over 101F or if uterine pain, than to call gyencolgoist.    - Patient notes overall feels the same and bleeding is similar to prior 2 pregnancies. successfully breastfeeding. Called and advised Dr. [facility] nurse regarding visit. - I advised her to call back or return to office if symptoms worsen/change/persist.  - She was given AVS and expressed understanding with the diagnosis and plan as discussed. Follow-up and Dispositions    · Return if symptoms worsen or fail to improve.

## 2020-11-25 ENCOUNTER — OFFICE VISIT (OUTPATIENT)
Dept: INTERNAL MEDICINE CLINIC | Age: 30
End: 2020-11-25
Payer: COMMERCIAL

## 2020-11-25 ENCOUNTER — TELEPHONE (OUTPATIENT)
Dept: INTERNAL MEDICINE CLINIC | Age: 30
End: 2020-11-25

## 2020-11-25 VITALS
HEIGHT: 66 IN | SYSTOLIC BLOOD PRESSURE: 98 MMHG | OXYGEN SATURATION: 98 % | DIASTOLIC BLOOD PRESSURE: 66 MMHG | RESPIRATION RATE: 18 BRPM | BODY MASS INDEX: 28.12 KG/M2 | HEART RATE: 72 BPM | TEMPERATURE: 98.7 F | WEIGHT: 175 LBS

## 2020-11-25 DIAGNOSIS — M25.532 PAIN, WRIST, LEFT: ICD-10-CM

## 2020-11-25 DIAGNOSIS — M25.572 LEFT ANKLE PAIN, UNSPECIFIED CHRONICITY: ICD-10-CM

## 2020-11-25 DIAGNOSIS — Z97.5 IUD (INTRAUTERINE DEVICE) IN PLACE: ICD-10-CM

## 2020-11-25 DIAGNOSIS — Z00.00 WELL WOMAN EXAM (NO GYNECOLOGICAL EXAM): Primary | ICD-10-CM

## 2020-11-25 PROCEDURE — 99395 PREV VISIT EST AGE 18-39: CPT | Performed by: INTERNAL MEDICINE

## 2020-11-25 NOTE — PROGRESS NOTES
RM # 14  Declined the Flu vaccine    3 most recent PHQ Screens 11/25/2020   Little interest or pleasure in doing things Not at all   Feeling down, depressed, irritable, or hopeless Not at all   Total Score PHQ 2 0       Chief Complaint   Patient presents with    Physical    Ankle Pain     left ankle hurts on excersion       1. Have you been to the ER, urgent care clinic since your last visit? Hospitalized since your last visit? No    2. Have you seen or consulted any other health care providers outside of the [address] [place] [address] since your last visit? Include any pap smears or colon screening. No    Health Maintenance Due   Topic Date Due    DTaP/Tdap/Td series (1 - Tdap) 02/12/2011    PAP AKA CERVICAL CYTOLOGY  02/12/2011    Flu Vaccine (1) 09/01/2020       Learning Assessment 3/27/2018   PRIMARY LEARNER Patient   HIGHEST LEVEL OF EDUCATION - PRIMARY LEARNER  4 YEARS OF COLLEGE   BARRIERS PRIMARY LEARNER NONE   CO-LEARNER CAREGIVER No   PRIMARY LANGUAGE OTHER (COMMENT)   LEARNER PREFERENCE PRIMARY DEMONSTRATION   ANSWERED BY self   RELATIONSHIP SELF           AVS, education, follow up and recommendations provided and addressed with patient.   services used to advise patient no

## 2020-11-25 NOTE — TELEPHONE ENCOUNTER
Request for records faxed to [phone]. Confirmation received. Document placed in bin for centralized scanning.

## 2020-11-25 NOTE — PROGRESS NOTES
Subjective:   27 y.o. female for Well Woman Check. Her gyne and breast care is done elsewhere by her Ob-Gyne physician. Past medical, Social, and Family history reviewed  Medications reviewed and updated. ROS: Feeling generally well. No TIA's or unusual headaches, no dysphagia. No prolonged cough. No dyspnea or chest pain on exertion. No abdominal pain, change in bowel habits, black or bloody stools. No urinary tract symptoms. No new or unusual musculoskeletal symptoms. Specific concerns today:      Left ankle pain with walking, standing - x 3 years  No imaging  No known injury   occassional mild swelling  Feels better when she elevates foot    Left wrist pains at times as well that pt attributes to repetitive activity    Mother has leg pains as well but no clear dx    Sees GYN - PAP 4/3/19 per care everywhere  Dr. Bishop Lama at [facility]  IUD in place      Objective: The patient appears well, alert, oriented x 3, in no distress. There were no vitals taken for this visit. ENT normal.  Neck supple. No adenopathy or thyromegaly. ELENA. Lungs are clear, good air entry, no wheezes, rhonchi or rales. S1 and S2 normal, no murmurs, regular rate and rhythm. Abdomen soft without tenderness, guarding, mass or organomegaly. Extremities show no edema, normal peripheral pulses. Neurological is normal, no focal findings. Breast and Pelvic exams are deferred. Prior labs reviewed. Assessment/Plan:   Well Woman  follow low fat diet, routine labs ordered, call if any problems    ICD-10-CM ICD-9-CM    1. Well woman exam (no gynecological exam)  Z00.00 V70.0 CBC WITH AUTOMATED DIFF      HEMOGLOBIN A1C WITH EAG      VITAMIN D, 25 HYDROXY      LIPID PANEL      METABOLIC PANEL, COMPREHENSIVE      METABOLIC PANEL, COMPREHENSIVE      LIPID PANEL      VITAMIN D, 25 HYDROXY      HEMOGLOBIN A1C WITH EAG      CBC WITH AUTOMATED DIFF    [V70.0]   2.  Left ankle pain, unspecified chronicity  M25.572 719.47 XR ANKLE LT MIN 3 V      C REACTIVE PROTEIN, QT      CK      URIC ACID      TSH 3RD GENERATION      T4, FREE      SED RATE (ESR)      LITTLE, DIRECT, W/REFLEX      RA + CCP ABS      RA + CCP ABS      LITTLE, DIRECT, W/REFLEX      SED RATE (ESR)      T4, FREE      TSH 3RD GENERATION      URIC ACID      CK      C REACTIVE PROTEIN, QT   3. Pain, wrist, left  M25.532 719.43 C REACTIVE PROTEIN, QT      CK      URIC ACID      TSH 3RD GENERATION      T4, FREE      SED RATE (ESR)      LITTLE, DIRECT, W/REFLEX      RA + CCP ABS      RA + CCP ABS      LITTLE, DIRECT, W/REFLEX      SED RATE (ESR)      T4, FREE      TSH 3RD GENERATION      URIC ACID      CK      C REACTIVE PROTEIN, QT   4. IUD (intrauterine device) in place  Z97.5 V45.51      Follow-up and Dispositions    · Return in about 1 year (around 11/25/2021), or if symptoms worsen or fail to improve, for wellness visit. results and schedule of future studies reviewed with patient  reviewed diet, exercise and weight   cardiovascular risk and specific lipid/LDL goals reviewed  reviewed medications and side effects in detail     Left ankle Xrays  Serology screening  Consider rheum or ortho foot referral as indicated.   Call with results  Pt declines flu shot  Get further records from [facility]

## 2020-11-27 DIAGNOSIS — E55.9 VITAMIN D DEFICIENCY: Primary | ICD-10-CM

## 2020-11-27 LAB
25(OH)D3 SERPL-MCNC: <9 NG/ML (ref 30–100)
ALBUMIN SERPL-MCNC: 3.7 G/DL (ref 3.5–5)
ALBUMIN/GLOB SERPL: 1.1 {RATIO} (ref 1.1–2.2)
ALP SERPL-CCNC: 84 U/L (ref 45–117)
ALT SERPL-CCNC: 16 U/L (ref 12–78)
ANA SER QL: NEGATIVE
ANION GAP SERPL CALC-SCNC: 2 MMOL/L (ref 5–15)
AST SERPL-CCNC: 9 U/L (ref 15–37)
BASOPHILS # BLD: 0 K/UL (ref 0–0.1)
BASOPHILS NFR BLD: 1 % (ref 0–1)
BILIRUB SERPL-MCNC: 0.5 MG/DL (ref 0.2–1)
BUN SERPL-MCNC: 11 MG/DL (ref 6–20)
BUN/CREAT SERPL: 20 (ref 12–20)
CALCIUM SERPL-MCNC: 8.5 MG/DL (ref 8.5–10.1)
CCP IGA+IGG SERPL IA-ACNC: 4 UNITS (ref 0–19)
CHLORIDE SERPL-SCNC: 108 MMOL/L (ref 97–108)
CHOLEST SERPL-MCNC: 163 MG/DL
CK SERPL-CCNC: 71 U/L (ref 26–192)
CO2 SERPL-SCNC: 28 MMOL/L (ref 21–32)
CREAT SERPL-MCNC: 0.55 MG/DL (ref 0.55–1.02)
CRP SERPL-MCNC: 0.65 MG/DL (ref 0–0.6)
DIFFERENTIAL METHOD BLD: NORMAL
EOSINOPHIL # BLD: 0.3 K/UL (ref 0–0.4)
EOSINOPHIL NFR BLD: 6 % (ref 0–7)
ERYTHROCYTE [DISTWIDTH] IN BLOOD BY AUTOMATED COUNT: 13 % (ref 11.5–14.5)
ERYTHROCYTE [SEDIMENTATION RATE] IN BLOOD: 18 MM/HR (ref 0–20)
EST. AVERAGE GLUCOSE BLD GHB EST-MCNC: 103 MG/DL
GLOBULIN SER CALC-MCNC: 3.3 G/DL (ref 2–4)
GLUCOSE SERPL-MCNC: 86 MG/DL (ref 65–100)
HBA1C MFR BLD: 5.2 % (ref 4–5.6)
HCT VFR BLD AUTO: 39.5 % (ref 35–47)
HDLC SERPL-MCNC: 58 MG/DL
HDLC SERPL: 2.8 {RATIO} (ref 0–5)
HGB BLD-MCNC: 12.7 G/DL (ref 11.5–16)
IMM GRANULOCYTES # BLD AUTO: 0 K/UL (ref 0–0.04)
IMM GRANULOCYTES NFR BLD AUTO: 0 % (ref 0–0.5)
LDLC SERPL CALC-MCNC: 94.4 MG/DL (ref 0–100)
LIPID PROFILE,FLP: NORMAL
LYMPHOCYTES # BLD: 1.9 K/UL (ref 0.8–3.5)
LYMPHOCYTES NFR BLD: 42 % (ref 12–49)
MCH RBC QN AUTO: 28.3 PG (ref 26–34)
MCHC RBC AUTO-ENTMCNC: 32.2 G/DL (ref 30–36.5)
MCV RBC AUTO: 88.2 FL (ref 80–99)
MONOCYTES # BLD: 0.4 K/UL (ref 0–1)
MONOCYTES NFR BLD: 9 % (ref 5–13)
NEUTS SEG # BLD: 1.9 K/UL (ref 1.8–8)
NEUTS SEG NFR BLD: 42 % (ref 32–75)
NRBC # BLD: 0 K/UL (ref 0–0.01)
NRBC BLD-RTO: 0 PER 100 WBC
PLATELET # BLD AUTO: 250 K/UL (ref 150–400)
PMV BLD AUTO: 11.1 FL (ref 8.9–12.9)
POTASSIUM SERPL-SCNC: 4.1 MMOL/L (ref 3.5–5.1)
PROT SERPL-MCNC: 7 G/DL (ref 6.4–8.2)
RBC # BLD AUTO: 4.48 M/UL (ref 3.8–5.2)
RHEUMATOID FACT SERPL-ACNC: <10 IU/ML (ref 0–13.9)
SODIUM SERPL-SCNC: 138 MMOL/L (ref 136–145)
T4 FREE SERPL-MCNC: 1 NG/DL (ref 0.8–1.5)
TRIGL SERPL-MCNC: 53 MG/DL (ref ?–150)
TSH SERPL DL<=0.05 MIU/L-ACNC: 1.5 UIU/ML (ref 0.36–3.74)
URATE SERPL-MCNC: 3.3 MG/DL (ref 2.6–6)
VLDLC SERPL CALC-MCNC: 10.6 MG/DL
WBC # BLD AUTO: 4.5 K/UL (ref 3.6–11)

## 2020-11-27 RX ORDER — ERGOCALCIFEROL 1.25 MG/1
50000 CAPSULE ORAL
Qty: 12 CAP | Refills: 0 | Status: SHIPPED | OUTPATIENT
Start: 2020-11-27 | End: 2021-02-13

## 2020-11-27 RX ORDER — MELATONIN
1000 DAILY
Qty: 30 TAB | Refills: 11 | Status: SHIPPED | OUTPATIENT
Start: 2020-11-27 | End: 2021-12-10 | Stop reason: SDUPTHER

## 2020-11-27 NOTE — PROGRESS NOTES
Please notify pt of results    Vitamin D is very low. She will need to take a weekly high dosed vitamin D for 12 weeks along with long term daily vitamin D. We should reassess in 3-4 months  This may have something to do with her ankle pain if her bones are weak due to low vitamin D. Encourage her to get the Xrays I ordered.   Other labs are all normal.

## 2020-11-30 ENCOUNTER — HOSPITAL ENCOUNTER (OUTPATIENT)
Dept: GENERAL RADIOLOGY | Age: 30
Discharge: HOME | End: 2020-11-30
Payer: COMMERCIAL

## 2020-11-30 DIAGNOSIS — M25.572 LEFT ANKLE PAIN, UNSPECIFIED CHRONICITY: ICD-10-CM

## 2020-11-30 PROCEDURE — 73610 X-RAY EXAM OF ANKLE: CPT | Performed by: INTERNAL MEDICINE

## 2020-12-05 NOTE — TELEPHONE ENCOUNTER
Letter has been sent:    Normal ankle bones and structure. RECOMMENDATIONS:  Continue with current  medications.   Make an appt to see Dr. Micky Early, foot and ankle specialist, at [place] [address]     Please notify pt of results

## 2020-12-05 NOTE — PROGRESS NOTES
Inform pt of lab results along with providers recommendations. Understanding voiced by pt.  Please review xray results with recommendations

## 2020-12-05 NOTE — TELEPHONE ENCOUNTER
----- Message from [facility] sent at 12/5/2020 12:23 PM EST -----  Inform pt of lab results along with providers recommendations. Understanding voiced by pt.  Please review xray results with recommendations

## 2020-12-08 NOTE — TELEPHONE ENCOUNTER
Left Voice mail advising letter mailed with results and recommendations.  Requested patient contact the office with questions

## 2021-04-22 ENCOUNTER — IMMUNIZATION (OUTPATIENT)
Dept: INTERNAL MEDICINE CLINIC | Age: 31
End: 2021-04-22
Payer: COMMERCIAL

## 2021-04-22 DIAGNOSIS — Z23 ENCOUNTER FOR IMMUNIZATION: Primary | ICD-10-CM

## 2021-04-22 PROCEDURE — 91300 COVID-19, MRNA, LNP-S, PF, 30MCG/0.3ML DOSE(PFIZER): CPT | Performed by: FAMILY MEDICINE

## 2021-04-22 PROCEDURE — 0001A COVID-19, MRNA, LNP-S, PF, 30MCG/0.3ML DOSE(PFIZER): CPT | Performed by: FAMILY MEDICINE

## 2021-05-15 ENCOUNTER — IMMUNIZATION (OUTPATIENT)
Dept: INTERNAL MEDICINE CLINIC | Age: 31
End: 2021-05-15
Payer: COMMERCIAL

## 2021-05-15 DIAGNOSIS — Z23 ENCOUNTER FOR IMMUNIZATION: Primary | ICD-10-CM

## 2021-05-15 PROCEDURE — 0002A COVID-19, MRNA, LNP-S, PF, 30MCG/0.3ML DOSE(PFIZER): CPT | Performed by: FAMILY MEDICINE

## 2021-05-15 PROCEDURE — 91300 COVID-19, MRNA, LNP-S, PF, 30MCG/0.3ML DOSE(PFIZER): CPT | Performed by: FAMILY MEDICINE

## 2021-11-01 ENCOUNTER — NURSE TRIAGE (OUTPATIENT)
Dept: OTHER | Facility: CLINIC | Age: 31
End: 2021-11-01

## 2021-11-01 NOTE — TELEPHONE ENCOUNTER
Reason for Disposition   Constant abdominal pain lasting > 2 hours   Urinating more frequently than usual (i.e., frequency)    Answer Assessment - Initial Assessment Questions  1. LOCATION: \"Where does it hurt? \"     left ribs comes and goes. 2. RADIATION: \"Does the pain shoot anywhere else? \" (e.g., chest, back)  Back    3. ONSET: \"When did the pain begin? \" (e.g., minutes, hours or days ago)        A few weeks    4. SUDDEN: \"Gradual or sudden onset? \"       Sudden    5. PATTERN \"Does the pain come and go, or is it constant? \"     - If constant: \"Is it getting better, staying the same, or worsening? \"       (Note: Constant means the pain never goes away completely; most serious pain is constant and it progresses)      - If intermittent: \"How long does it last?\" \"Do you have pain now? \"      (Note: Intermittent means the pain goes away completely between bouts)  Comes and goes    6. SEVERITY: \"How bad is the pain? \"  (e.g., Scale 1-10; mild, moderate, or severe)     - MILD (1-3): doesn't interfere with normal activities, abdomen soft and not tender to touch      - MODERATE (4-7): interferes with normal activities or awakens from sleep, tender to touch      - SEVERE (8-10): excruciating pain, doubled over, unable to do any normal activities    6 out of 10    7. RECURRENT SYMPTOM: \"Have you ever had this type of abdominal pain before? \" If so, ask: \"When was the last time? \" and \"What happened that time? \"       No    8. AGGRAVATING FACTORS: \"Does anything seem to cause this pain? \" (e.g., foods, stress, alcohol)      9. CARDIAC SYMPTOMS: \"Do you have any of the following symptoms: chest pain, difficulty breathing, sweating, nausea? \"  Shortness of breath    10. OTHER SYMPTOMS: \"Do you have any other symptoms? \" (e.g., fever, vomiting, diarrhea)     Dizziness,    11. PREGNANCY: \"Is there any chance you are pregnant? \" \"When was your last menstrual period? \"       No    Answer Assessment - Initial Assessment Questions  1. SYMPTOM: \"What's the main symptom you're concerned about? \" (e.g., frequency, incontinence)      burring with urination    2. ONSET: \"When did the  *No Answer*  start? \"    2 weeks ago    3. PAIN: \"Is there any pain? \" If so, ask: \"How bad is it? \" (Scale: 1-10; mild, moderate, severe)  Sometimes    4. CAUSE: \"What do you think is causing the symptoms? \"      *No Answer*  5. OTHER SYMPTOMS: \"Do you have any other symptoms? \" (e.g., fever, flank pain, blood in urine, pain with urination)    Upper abdominal pain   6. PREGNANCY: \"Is there any chance you are pregnant? \" \"When was your last menstrual period? \"      *No Answer*    Protocols used: ABDOMINAL PAIN - UPPER-ADULT-OH, URINARY SYMPTOMS-ADULT-OH

## 2021-12-10 ENCOUNTER — OFFICE VISIT (OUTPATIENT)
Dept: INTERNAL MEDICINE CLINIC | Age: 31
End: 2021-12-10
Payer: COMMERCIAL

## 2021-12-10 VITALS
DIASTOLIC BLOOD PRESSURE: 65 MMHG | HEART RATE: 60 BPM | WEIGHT: 176.15 LBS | SYSTOLIC BLOOD PRESSURE: 97 MMHG | OXYGEN SATURATION: 100 % | BODY MASS INDEX: 28.31 KG/M2 | TEMPERATURE: 97.7 F | HEIGHT: 66 IN | RESPIRATION RATE: 16 BRPM

## 2021-12-10 DIAGNOSIS — M25.572 LEFT ANKLE PAIN, UNSPECIFIED CHRONICITY: ICD-10-CM

## 2021-12-10 DIAGNOSIS — E55.9 VITAMIN D DEFICIENCY: ICD-10-CM

## 2021-12-10 DIAGNOSIS — Z00.00 WELL WOMAN EXAM (NO GYNECOLOGICAL EXAM): Primary | ICD-10-CM

## 2021-12-10 DIAGNOSIS — R73.9 HYPERGLYCEMIA: ICD-10-CM

## 2021-12-10 DIAGNOSIS — Z11.59 NEED FOR HEPATITIS C SCREENING TEST: ICD-10-CM

## 2021-12-10 DIAGNOSIS — R10.9 LEFT FLANK PAIN: ICD-10-CM

## 2021-12-10 DIAGNOSIS — G56.03 CARPAL TUNNEL SYNDROME, BILATERAL: ICD-10-CM

## 2021-12-10 DIAGNOSIS — R20.2 PARESTHESIA OF HAND, BILATERAL: ICD-10-CM

## 2021-12-10 LAB
25(OH)D3 SERPL-MCNC: 19.3 NG/ML (ref 30–100)
ALBUMIN SERPL-MCNC: 3.9 G/DL (ref 3.5–5)
ALBUMIN/GLOB SERPL: 1.1 {RATIO} (ref 1.1–2.2)
ALP SERPL-CCNC: 79 U/L (ref 45–117)
ALT SERPL-CCNC: 16 U/L (ref 12–78)
ANION GAP SERPL CALC-SCNC: 4 MMOL/L (ref 5–15)
AST SERPL-CCNC: 11 U/L (ref 15–37)
BASOPHILS # BLD: 0 K/UL (ref 0–0.1)
BASOPHILS NFR BLD: 1 % (ref 0–1)
BILIRUB SERPL-MCNC: 0.4 MG/DL (ref 0.2–1)
BILIRUB UR QL STRIP: NEGATIVE
BUN SERPL-MCNC: 6 MG/DL (ref 6–20)
BUN/CREAT SERPL: 12 (ref 12–20)
CALCIUM SERPL-MCNC: 8.9 MG/DL (ref 8.5–10.1)
CHLORIDE SERPL-SCNC: 105 MMOL/L (ref 97–108)
CHOLEST SERPL-MCNC: 152 MG/DL
CO2 SERPL-SCNC: 28 MMOL/L (ref 21–32)
CREAT SERPL-MCNC: 0.52 MG/DL (ref 0.55–1.02)
DIFFERENTIAL METHOD BLD: ABNORMAL
EOSINOPHIL # BLD: 0 K/UL (ref 0–0.4)
EOSINOPHIL NFR BLD: 1 % (ref 0–7)
ERYTHROCYTE [DISTWIDTH] IN BLOOD BY AUTOMATED COUNT: 13.5 % (ref 11.5–14.5)
EST. AVERAGE GLUCOSE BLD GHB EST-MCNC: 105 MG/DL
GLOBULIN SER CALC-MCNC: 3.5 G/DL (ref 2–4)
GLUCOSE SERPL-MCNC: 82 MG/DL (ref 65–100)
GLUCOSE UR-MCNC: NEGATIVE MG/DL
HBA1C MFR BLD: 5.3 % (ref 4–5.6)
HCT VFR BLD AUTO: 39.9 % (ref 35–47)
HDLC SERPL-MCNC: 53 MG/DL
HDLC SERPL: 2.9 {RATIO} (ref 0–5)
HGB BLD-MCNC: 12.7 G/DL (ref 11.5–16)
IMM GRANULOCYTES # BLD AUTO: 0 K/UL (ref 0–0.04)
IMM GRANULOCYTES NFR BLD AUTO: 0 % (ref 0–0.5)
KETONES P FAST UR STRIP-MCNC: NEGATIVE MG/DL
LDLC SERPL CALC-MCNC: 90.4 MG/DL (ref 0–100)
LYMPHOCYTES # BLD: 1.9 K/UL (ref 0.8–3.5)
LYMPHOCYTES NFR BLD: 51 % (ref 12–49)
MCH RBC QN AUTO: 28.1 PG (ref 26–34)
MCHC RBC AUTO-ENTMCNC: 31.8 G/DL (ref 30–36.5)
MCV RBC AUTO: 88.3 FL (ref 80–99)
MONOCYTES # BLD: 0.4 K/UL (ref 0–1)
MONOCYTES NFR BLD: 9 % (ref 5–13)
NEUTS SEG # BLD: 1.5 K/UL (ref 1.8–8)
NEUTS SEG NFR BLD: 38 % (ref 32–75)
NRBC # BLD: 0 K/UL (ref 0–0.01)
NRBC BLD-RTO: 0 PER 100 WBC
PH UR STRIP: 5 [PH] (ref 4.6–8)
PLATELET # BLD AUTO: 276 K/UL (ref 150–400)
PMV BLD AUTO: 10.4 FL (ref 8.9–12.9)
POTASSIUM SERPL-SCNC: 3.9 MMOL/L (ref 3.5–5.1)
PROT SERPL-MCNC: 7.4 G/DL (ref 6.4–8.2)
PROT UR QL STRIP: NEGATIVE
RBC # BLD AUTO: 4.52 M/UL (ref 3.8–5.2)
SODIUM SERPL-SCNC: 137 MMOL/L (ref 136–145)
SP GR UR STRIP: 1.03 (ref 1–1.03)
T4 FREE SERPL-MCNC: 1.1 NG/DL (ref 0.8–1.5)
TRIGL SERPL-MCNC: 43 MG/DL (ref ?–150)
TSH SERPL DL<=0.05 MIU/L-ACNC: 0.94 UIU/ML (ref 0.36–3.74)
UA UROBILINOGEN AMB POC: ABNORMAL (ref 0.2–1)
URINALYSIS CLARITY POC: CLEAR
URINALYSIS COLOR POC: YELLOW
URINE BLOOD POC: ABNORMAL
URINE LEUKOCYTES POC: NEGATIVE
URINE NITRITES POC: NEGATIVE
VLDLC SERPL CALC-MCNC: 8.6 MG/DL
WBC # BLD AUTO: 3.8 K/UL (ref 3.6–11)

## 2021-12-10 PROCEDURE — 81002 URINALYSIS NONAUTO W/O SCOPE: CPT | Performed by: INTERNAL MEDICINE

## 2021-12-10 PROCEDURE — 99395 PREV VISIT EST AGE 18-39: CPT | Performed by: INTERNAL MEDICINE

## 2021-12-10 PROCEDURE — 99214 OFFICE O/P EST MOD 30 MIN: CPT | Performed by: INTERNAL MEDICINE

## 2021-12-10 RX ORDER — ARM BRACE
EACH MISCELLANEOUS
Qty: 2 EACH | Refills: 1 | Status: SHIPPED | OUTPATIENT
Start: 2021-12-10 | End: 2022-09-13

## 2021-12-10 RX ORDER — MELATONIN
1000 DAILY
Qty: 30 TABLET | Refills: 11 | Status: SHIPPED | OUTPATIENT
Start: 2021-12-10

## 2021-12-10 NOTE — PROGRESS NOTES
Subjective:   32 y.o. female for Well Woman Check. Her gyne and breast care is done elsewhere by her Ob-Gyne physician. Past medical, Social, and Family history reviewed  Medications reviewed and updated. ROS: Feeling generally well. No TIA's or unusual headaches, no dysphagia. No prolonged cough. No dyspnea or chest pain on exertion. No urinary tract symptoms. Specific concerns today:     See other    Objective: The patient appears well, alert, oriented x 3, in no distress. Visit Vitals  BP 97/65 (BP 1 Location: Right arm, BP Patient Position: Sitting, BP Cuff Size: Large adult)   Pulse 60   Temp 97.7 °F (36.5 °C) (Oral)   Resp 16   Ht 5' 6\" (1.676 m)   Wt 176 lb 2.4 oz (79.9 kg)   LMP 12/02/2021   SpO2 100%   BMI 28.43 kg/m²     ENT normal.  Neck supple. No adenopathy or thyromegaly. ELENA. Lungs are clear, good air entry, no wheezes, rhonchi or rales. S1 and S2 normal, no murmurs, regular rate and rhythm. Abdomen soft without tenderness, guarding, mass or organomegaly. Extremities show no edema, normal peripheral pulses. Neurological is normal, no focal findings. Reproduced left hand discomfort with prolonged flexion of wrist  Breast and Pelvic exams are deferred. Prior labs reviewed. POC UA - 2+ blood    Assessment/Plan:   Well Woman    follow low fat diet, routine labs ordered, call if any problems    ICD-10-CM ICD-9-CM    1. Well woman exam (no gynecological exam)  Z00.00 V70.0 CBC WITH AUTOMATED DIFF      HEMOGLOBIN A1C WITH EAG      VITAMIN D, 25 HYDROXY      TSH 3RD GENERATION      T4, FREE      METABOLIC PANEL, COMPREHENSIVE      LIPID PANEL      LIPID PANEL      METABOLIC PANEL, COMPREHENSIVE      T4, FREE      TSH 3RD GENERATION      VITAMIN D, 25 HYDROXY      HEMOGLOBIN A1C WITH EAG      CBC WITH AUTOMATED DIFF    [V70.0]   2. Vitamin D deficiency  E55.9 268.9 cholecalciferol (VITAMIN D3) (1000 Units /25 mcg) tablet      VITAMIN D, 25 HYDROXY      VITAMIN D, 25 HYDROXY   3. Paresthesia of hand, bilateral  R20.2 782.0 Arm Brace (Wrist Brace) misc      TSH 3RD GENERATION      T4, FREE      T4, FREE      TSH 3RD GENERATION   4. Carpal tunnel syndrome, bilateral  G56.03 354.0 Arm Brace (Wrist Brace) misc   5. Left flank pain  R10.9 789.09 AMB POC URINALYSIS DIP STICK MANUAL W/O MICRO      CT ABD PELV WO CONT   6. Hyperglycemia  R73.9 790.29 HEMOGLOBIN A1C WITH EAG      HEMOGLOBIN A1C WITH EAG   7. Need for hepatitis C screening test  Z11.59 V73.89 HCV AB W/RFLX TO [facility]      HCV AB W/RFLX TO [facility]   8. Left ankle pain, unspecified chronicity  M25.572 719.47 REFERRAL TO ORTHOPEDICS     Follow-up and Dispositions    · Return in about 1 year (around 12/10/2022), or if symptoms worsen or fail to improve, for wellness visit.        results and schedule of future studies reviewed with patient  reviewed diet, exercise and weight   cardiovascular risk and specific lipid/LDL goals reviewed  reviewed medications and side effects in detail     Screening labs

## 2021-12-10 NOTE — PROGRESS NOTES
Identified pt with two pt identifiers(name and ). Reviewed record in preparation for visit and have obtained necessary documentation. Chief Complaint   Patient presents with    Complete Physical    Tingling     Bilateral hands    Abdominal Pain     intermittent left abdominal pain        Health Maintenance Due   Topic    Hepatitis C Screening     Flu Vaccine (1)    COVID-19 Vaccine (3 - Booster for [employer] series)        Visit Vitals  BP 97/65 (BP 1 Location: Right arm, BP Patient Position: Sitting, BP Cuff Size: Large adult)   Pulse 60   Temp 97.7 °F (36.5 °C) (Oral)   Resp 16   Ht 5' 6\" (1.676 m)   Wt 176 lb 2.4 oz (79.9 kg)   LMP 2021   SpO2 100%   BMI 28.43 kg/m²     Pain Scale: /10    Coordination of Care Questionnaire:  :   1. Have you been to the ER, urgent care clinic since your last visit? Hospitalized since your last visit? No    2. Have you seen or consulted any other health care providers outside of the [address] [place] [address] since your last visit? Include any pap smears or colon screening.  No

## 2021-12-11 LAB
HCV AB S/CO SERPL IA: <0.1 S/CO RATIO (ref 0–0.9)
HCV AB SERPL QL IA: NORMAL

## 2021-12-12 NOTE — PROGRESS NOTES
HPI:  established patient  Presents for f/u hand symptoms, etc    C/o left flank pain similar to prior pain when younger dx as kidney stone  Pain present with passing urine  Intermittent, +/- colicky pain    No constipation    C/p hand tingling and discomfort,  Mainly at night  No reported repetitive activity    Left ankle pain - saw ortho  Ref to PT  PT helped  But, now symptoms have recurred. Past medical, Social, and Family history reviewed    Prior to Admission medications    Medication Sig Start Date End Date Taking? Authorizing Provider   cholecalciferol (VITAMIN D3) (1000 Units /25 mcg) tablet Take 1 Tablet by mouth daily. 12/10/21  Yes Federica Mejia MD   Arm Brace (Wrist Brace) misc Daily use as directed 12/10/21  Yes Federica Mejia MD   ibuprofen (MOTRIN) 400 mg tablet Take 1 Tab by mouth every six (6) hours as needed for Pain. 12/2/17  Yes Rosevelt Face, DO   ibuprofen (MOTRIN) 800 mg tablet  11/10/19   Provider, Historical   guaiFENesin ER (MUCINEX) 600 mg ER tablet Take 1 Tab by mouth two (2) times a day. Patient not taking: Reported on 12/10/2021 11/12/19   Raya Barber MD          ROS  Complete ROS reviewed and negative or stable except as noted in HPI. Physical Exam  Vitals and nursing note reviewed. Constitutional:       General: She is not in acute distress. HENT:      Head: Normocephalic and atraumatic. Eyes:      General: No scleral icterus. Pupils: Pupils are equal, round, and reactive to light. Cardiovascular:      Rate and Rhythm: Normal rate. Heart sounds: Normal heart sounds. No murmur heard. Pulmonary:      Effort: Pulmonary effort is normal. No respiratory distress. Breath sounds: No wheezing or rales. Abdominal:      General: There is no distension. Palpations: Abdomen is soft. Tenderness: There is abdominal tenderness (LUQ). Musculoskeletal:         General: Normal range of motion.       Cervical back: Normal range of motion and neck supple. Comments: Reproduced left hand discomfort with prolonged flexion of wrist   Skin:     General: Skin is warm. Findings: No rash. Neurological:      Mental Status: She is alert and oriented to person, place, and time. Motor: No abnormal muscle tone. Prior labs reviewed. Assessment/Plan:    ICD-10-CM ICD-9-CM    1. Paresthesia of hand, bilateral  R20.2 782.0 [place]ace (Wrist [place]ace[place] [facility] [place]      TSH 3RD GENERATION      T4, FREE      T4, FREE      TSH 3RD GENERATION   2. Vitamin D deficiency  E55.9 268.9 cholecalciferol (VITAMIN D3) (1000 Units /25 mcg) tablet      VITAMIN D, 25 HYDROXY      VITAMIN D, 25 HYDROXY   3. Carpal tunnel syndrome, bilateral  G56.03 354.0 Arm Brace (Wrist Brace) [facility] [place]   4. Well woman exam (no gynecological exam)  Z00.00 V70.0 CBC WITH AUTOMATED DIFF      HEMOGLOBIN A1C WITH EAG      VITAMIN D, 25 HYDROXY      TSH 3RD GENERATION      T4, FREE      METABOLIC PANEL, COMPREHENSIVE      LIPID PANEL      LIPID PANEL      METABOLIC PANEL, COMPREHENSIVE      T4, FREE      TSH 3RD GENERATION      VITAMIN D, 25 HYDROXY      HEMOGLOBIN A1C WITH EAG      CBC WITH AUTOMATED DIFF    [V70.0]   5. Left flank pain  R10.9 789.09 AMB POC URINALYSIS DIP STICK MANUAL W/O MICRO      CT ABD PELV WO CONT   6. Hyperglycemia  R73.9 790.29 HEMOGLOBIN A1C WITH EAG      HEMOGLOBIN A1C WITH EAG   7. Need for hepatitis C screening test  Z11.59 V73.89 HCV AB W/RFLX TO [facility]      HCV AB W/RFLX TO [facility]   8. Left ankle pain, unspecified chronicity  M25.572 719.47 REFERRAL TO ORTHOPEDICS     Follow-up and Dispositions    · Return in about 1 year (around 12/10/2022), or if symptoms worsen or fail to improve, for wellness visit.         results and schedule of future studies reviewed with patient  reviewed diet, exercise and weight   cardiovascular risk and specific lipid/LDL goals reviewed  reviewed medications and side effects in detail      renal colic CT  Wrist braces  Consider ortho hand referral  Labs  Ref back to ortho foot    An electronic signature was used to authenticate this note.   -- Benigno Dillard MD

## 2022-03-19 PROBLEM — E55.9 VITAMIN D DEFICIENCY: Status: ACTIVE | Noted: 2020-11-27

## 2022-03-20 PROBLEM — Z97.5 IUD (INTRAUTERINE DEVICE) IN PLACE: Status: ACTIVE | Noted: 2020-11-25

## 2022-09-08 ENCOUNTER — OFFICE VISIT (OUTPATIENT)
Dept: ORTHOPEDIC SURGERY | Age: 32
End: 2022-09-08
Payer: COMMERCIAL

## 2022-09-08 VITALS — BODY MASS INDEX: 28.28 KG/M2 | HEIGHT: 66 IN | WEIGHT: 176 LBS

## 2022-09-08 DIAGNOSIS — M79.605 LEFT LEG PAIN: ICD-10-CM

## 2022-09-08 DIAGNOSIS — M67.979 TIBIALIS POSTERIOR TENDINOPATHY: Primary | ICD-10-CM

## 2022-09-08 DIAGNOSIS — M21.42 PES PLANOVALGUS, ACQUIRED, LEFT: ICD-10-CM

## 2022-09-08 PROCEDURE — 99213 OFFICE O/P EST LOW 20 MIN: CPT | Performed by: ORTHOPAEDIC SURGERY

## 2022-09-08 NOTE — Clinical Note
9/12/2022    Patient: Gisela Lux   YOB: 1990   Date of Visit: 9/8/2022     Benigno Dillard MD  Via     Dear Benigno Dillard MD,      Thank you for referring Ms. Felicia Guillory to [facility] for evaluation. My notes for this consultation are attached. If you have questions, please do not hesitate to call me. I look forward to following your patient along with you.       Sincerely,    Ayde Ferrer MD

## 2022-09-12 NOTE — PROGRESS NOTES
Felicia Guillory (: 1990) is a 28 y.o. female, patient,here for evaluation of the following   Chief Complaint   Patient presents with    Leg Pain        ASSESSMENT/PLAN:  Below is the assessment and plan developed based on review of pertinent history, physical exam, labs, studies, and medications. 1. Tibialis posterior tendinopathy  2. Pes planovalgus, acquired, left  3. Left leg pain     Patient was last seen more than 2 years ago for the same problem, diagnosed with posterior tibial tendinitis and she now has complaint of the same but also extends into the upper leg and also occasional pain into the knee. In my opinion, this is mechanical malalignment causing the symptoms. She does not have swelling just the pain. I do recommend stretching program as demonstrated today in clinic, provided information about types of shoes and insoles to try, recommended evaluation by knee specialist to make sure that there is nothing else going on at the knee that may not be related to the current posterior tibial neuropathy and mechanical malalignment with valgus foot position. If symptoms do not further improve, she can return we will get x-rays of the left ankle, left foot 3 views weightbearing and we will consider possibly an MRI if necessary but for now, recommended anti-inflammatory medications, along with appropriate shoe wear and insoles. Return if symptoms worsen or fail to improve. No Known Allergies    Current Outpatient Medications   Medication Sig    cholecalciferol (VITAMIN D3) (1000 Units /25 mcg) tablet Take 1 Tablet by mouth daily. Arm Brace (Wrist Brace) misc Daily use as directed    ibuprofen (MOTRIN) 800 mg tablet  (Patient not taking: Reported on 12/10/2021)    guaiFENesin ER (MUCINEX) 600 mg ER tablet Take 1 Tab by mouth two (2) times a day. (Patient not taking: Reported on 12/10/2021)    ibuprofen (MOTRIN) 400 mg tablet Take 1 Tab by mouth every six (6) hours as needed for Pain.      No current facility-administered medications for this visit. Past Medical History:   Diagnosis Date    Vitamin D deficiency 2020       Past Surgical History:   Procedure Laterality Date    HX APPENDECTOMY         Family History   Problem Relation Age of Onset    No Known Problems Mother     No Known Problems Father        Social History     Socioeconomic History    Marital status:      Spouse name: Not on file    Number of children: Not on file    Years of education: Not on file    Highest education level: Not on file   Occupational History    Not on file   Tobacco Use    Smoking status: Never    Smokeless tobacco: Never   Substance and Sexual Activity    Alcohol use: No    Drug use: No    Sexual activity: Yes     Partners: Male   Other Topics Concern    Not on file   Social History Narrative    Not on file     Social Determinants of Health     Financial Resource Strain: Not on file   Food Insecurity: Not on file   Transportation Needs: Not on file   Physical Activity: Not on file   Stress: Not on file   Social Connections: Not on file   Intimate Partner Violence: Not on file   Housing Stability: Not on file           Vitals:  Ht 5' 6\" (1.676 m)   Wt 176 lb (79.8 kg)   BMI 28.41 kg/m²    Body mass index is 28.41 kg/m². SUBJECTIVE:  Felicia Guillory (: 1990)   Former patient presents today with complaint of left lower extremity pain that starts all the way from the knee down to the foot. This is been ongoing for years. The pain is described as extremely severe pain that is throbbing and comes and goes and there is swelling associated. Standing and walking make it worse. She has had improvements in the past with heat. She is tried physical therapy program.  She is not diabetic, non-smoker. I have seen this patient in the past for the same problem and had recommended appropriate shoe wear and insoles to start with and stretching program as previously demonstrated.   She was diagnosed with underlying posterior tibial tendinitis of the lower extremity and now also complains of knee pain. OBJECTIVE EXAM:     Visit Vitals  Ht 5' 6\" (1.676 m)   Wt 176 lb (79.8 kg)   BMI 28.41 kg/m²       Appearance: Alert, well appearing and pleasant patient who is in no distress, oriented to person, place/time, and who follows commands. This patient is accompanied in the       office by her  self. Psychiatric: Affect and mood are appropriate. No dementia noted on examination  Musculoskeletal:  LOCATION: Diffuse tenderness along the posterior medial ankle, medial leg and into the medial knee. Integumentary: No rashes, skin patches, wounds, or abrasions to the right or left legs       Warm and normal color. No regions of expressible drainage. Gait: Normal      Tenderness: No tenderness        Motor/Strength/Tone Exam: Normal       Sensory Exam:   Intact Normal Sensation to ankle/foot      Stability Testing: No anterolateral or varus instability of the Ankle or Subtalar Joints               No peroneal tendon instability noted      ROM: Normal ROM noted to ankle/foot      Contractures: No Achilles or Gastrocnemius Contractures      Calf tenderness: Absent for calf or gastrocnemius muscle regions       Soft, supple, non tender, non taut lower extremity compartment  Alignment:      NEUTRAL Hindfoot,         none Metatarsus Adductus Metatarsus   Wounds/Abrasions:    None present  Extremities:   No embolic phenomena to the toes          No significant edema to the foot and or toes.         Lower extremities are warm and appear well perfused    DVT: No evidence of DVT seen on examination at this time     No calf swelling, no tenderness to calf muscles  Lymphatic:  No Evidence of Lymphedema  Vascular: Medial Border of Tibia Region: Edema is not present         Pulses: Dorsalis Pedis &  Posterior Tibial Pulses : Palpable yes        Varicosities Lower Limbs :  None  noted  Neuro: Negative bilateral Straight leg raise (seated position)    See Musculoskeletal section for pertinent individual extremity examination    No abnormal hand/wrist, foot/ankle, or facial/neck tremors. Lower Extremity/Ankle/Foot:  Normal gait, satisfactory weightbearing stance. Left lower extremity/ankle: Tenderness that comes all the way from the medial knee down the medial leg and into the medial posterior tibial tendon area into the medial hindfoot, there is no significant swelling present, she does have some difficulty with with single-leg heel raise stance, when she stands she does have pes planovalgus position to the foot resulting in a valgus ankle. Nontender at all other areas of lower extremity, ankle and foot. There is a negative Lopez test, negative ankle squeeze test.  Calves are soft nontender. Left foot: There is pes planovalgus foot position, tender along the medial hindfoot, mild at the sinus tarsi, hindfoot and midfoot joints mostly nontender, no swelling present, metatarsals nontender, able to flex and extend toes satisfactory to motion and strength. Contralateral lower extremity/ankle /foot exam:  Nontender, no swelling ligaments grossly stable. Normal weightbearing stance. Neurovascular exam intact light touch sensation, cap refill, flexion/extension tone ankle strength intact. Imaging:    Previous x-rays were obtained from 2020 of left ankle did not demonstrate fracture or dislocation and basically no acute abnormalities were seen. We did not get new x-rays today. An electronic signature was used to authenticate this note.   -- Rm Dan MD

## 2022-09-13 ENCOUNTER — OFFICE VISIT (OUTPATIENT)
Dept: ORTHOPEDIC SURGERY | Age: 32
End: 2022-09-13
Payer: COMMERCIAL

## 2022-09-13 VITALS — BODY MASS INDEX: 28.28 KG/M2 | WEIGHT: 176 LBS | HEIGHT: 66 IN

## 2022-09-13 DIAGNOSIS — M25.562 ACUTE PAIN OF LEFT KNEE: Primary | ICD-10-CM

## 2022-09-13 PROCEDURE — 99214 OFFICE O/P EST MOD 30 MIN: CPT | Performed by: ORTHOPAEDIC SURGERY

## 2022-09-13 RX ORDER — MELOXICAM 15 MG/1
15 TABLET ORAL DAILY
Qty: 30 TABLET | Refills: 0 | Status: SHIPPED | OUTPATIENT
Start: 2022-09-13

## 2022-09-13 NOTE — PROGRESS NOTES
Felicia Guillory (: 1990) is a 28 y.o. female patient, here for evaluation of the following chief complaint(s):  Knee Pain (Left knee pain/)       ASSESSMENT/PLAN:  Below is the assessment and plan developed based on review of pertinent history, physical exam, labs, studies, and medications. 27-year-old female comes in today for evaluation of left knee pain. Has been intermittently struggling with several left ankle pain is followed by Dr. Riley Quintana for this. She had symptoms last year to get better but they have since returned. She was followed up for this. She also has some likely referred pain to her knee. Exam of her left knee today is benign. Ambulating without difficulty. X-rays with well-preserved joint space. We will start patient on meloxicam 15 mg once daily to help with knee and ankle discomfort. Encouraged physical therapy. Risks and benefits of medication discussed with patient. Patient verbalized understanding. Plans to follow-up with Dr. Riley Quintana in regards to her MRI that is ordered for her ankle. Can follow-up with us on an as-needed basis. 1. Acute pain of left knee  -     XR KNEE LT MIN 4 V; Future  -     meloxicam (MOBIC) 15 mg tablet; Take 1 Tablet by mouth daily. , Normal, Disp-30 Tablet, R-0      Encounter Diagnosis   Name Primary? Acute pain of left knee Yes        No follow-ups on file. SUBJECTIVE/OBJECTIVE:  Felicia Guillory (: 1990) is a 28 y.o. female who presents today for the following:  Chief Complaint   Patient presents with    Knee Pain     Left knee pain         27-year-old female comes in today for evaluation of left knee pain. Has been intermittently struggling with several left ankle pain is followed by Dr. Riley Quintana for this. She had symptoms last year to get better but they have since returned. She was followed up for this.     IMAGING:  XR Results (most recent):  Results from Appointment encounter on 22    XR KNEE LT MIN 4 V    Narrative  4 views left knee x-rays ordered and personally reviewed. Joint space well-preserved. No signs of osteoarthritic changes noted. No Known Allergies    Current Outpatient Medications   Medication Sig    meloxicam (MOBIC) 15 mg tablet Take 1 Tablet by mouth daily. cholecalciferol (VITAMIN D3) (1000 Units /25 mcg) tablet Take 1 Tablet by mouth daily. ibuprofen (MOTRIN) 400 mg tablet Take 1 Tab by mouth every six (6) hours as needed for Pain. No current facility-administered medications for this visit. Past Medical History:   Diagnosis Date    Vitamin D deficiency 11/27/2020        Past Surgical History:   Procedure Laterality Date    HX APPENDECTOMY         Family History   Problem Relation Age of Onset    No Known Problems Mother     No Known Problems Father         Social History     Tobacco Use    Smoking status: Never    Smokeless tobacco: Never   Substance Use Topics    Alcohol use: No        All systems reviewed x 12 and were negative with the exception of None      No flowsheet data found. Vitals:  Ht 5' 6\" (1.676 m)   Wt 176 lb (79.8 kg)   BMI 28.41 kg/m²    Body mass index is 28.41 kg/m². Physical Exam    General: NAD, well developed, well nourished, alert and oriented x 3. Cardiac: Extremities well perfused    Respiratory: Nonlabored breathing    LLE: Normal gait and station. Negative stinchfield. No effusion noted. No previous incisions noted. ROM 0-120 degrees. Grossly stable to varus/valgus stress and anterior/posterior drawer tests. Negative McMurrays. Minimal to no medial or lateral joint line tenderness. Motor grossly intact. RLE: Normal gait and station. Negative stinchfield. No effusion noted. No previous incisions noted. ROM 0-120 degrees. Grossly stable to varus/valgus stress and anterior/posterior drawer tests. Negative McMurrays. Motor grossly intact. Vascular: Palpable pedal pulses, equal bilaterally.     Skin: Warm well perfused, cap refill < 2 sec. Sarah Dugan M.D. was available for immediate consultation as the supervising physician. An electronic signature was used to authenticate this note.   -- Suzi Britton PA-C

## 2023-04-28 ENCOUNTER — OFFICE VISIT (OUTPATIENT)
Dept: URGENT CARE | Age: 33
End: 2023-04-28
Payer: COMMERCIAL

## 2023-04-28 VITALS
DIASTOLIC BLOOD PRESSURE: 76 MMHG | BODY MASS INDEX: 28.41 KG/M2 | HEART RATE: 104 BPM | TEMPERATURE: 98.7 F | OXYGEN SATURATION: 100 % | SYSTOLIC BLOOD PRESSURE: 109 MMHG | WEIGHT: 176 LBS | RESPIRATION RATE: 16 BRPM

## 2023-04-28 DIAGNOSIS — J02.0 ACUTE STREPTOCOCCAL PHARYNGITIS: Primary | ICD-10-CM

## 2023-04-28 DIAGNOSIS — R50.9 FEVER, UNSPECIFIED FEVER CAUSE: ICD-10-CM

## 2023-04-28 LAB
S PYO AG THROAT QL: POSITIVE
VALID INTERNAL CONTROL?: YES

## 2023-04-28 PROCEDURE — 87651 STREP A DNA AMP PROBE: CPT | Performed by: NURSE PRACTITIONER

## 2023-04-28 PROCEDURE — 99204 OFFICE O/P NEW MOD 45 MIN: CPT | Performed by: NURSE PRACTITIONER

## 2023-04-28 RX ORDER — AMOXICILLIN 500 MG/1
500 CAPSULE ORAL 2 TIMES DAILY
Qty: 20 CAPSULE | Refills: 0 | Status: SHIPPED | OUTPATIENT
Start: 2023-04-28 | End: 2023-05-08

## 2023-04-28 NOTE — PATIENT INSTRUCTIONS
Results for orders placed or performed in visit on 04/28/23   AMB POC STREP A DNA, AMP PROBE   Result Value Ref Range    VALID INTERNAL CONTROL POC Yes     Group A Strep Ag Positive Negative

## 2023-04-28 NOTE — PROGRESS NOTES
Subjective: (As above and below)     The patient/guardian gave verbal consent to treat. Chief Complaint   Patient presents with    Sore Throat     Patient c/o sore throat with fever started 5 days ago     Aquilino Cruz is a 35 y.o. female who presents for evaluation of : sore throat and fever. Symptom onset 5 days ago . Preceding illness: none. No other identified aggravating or alleviating factors. Symptoms are constant and overall unchanged. Denies: SOB, vomiting/diarrhea, rashes    Known Exposure to COVID-19: no      ROS  Review of Systems - negative except as listed above    Reviewed PmHx, RxHx, FmHx, SocHx, AllgHx and updated in chart. Family History   Problem Relation Age of Onset    No Known Problems Mother     No Known Problems Father        Past Medical History:   Diagnosis Date    Vitamin D deficiency 11/27/2020      Social History     Socioeconomic History    Marital status:    Tobacco Use    Smoking status: Never    Smokeless tobacco: Never   Substance and Sexual Activity    Alcohol use: No    Drug use: No    Sexual activity: Yes     Partners: Male          Current Outpatient Medications   Medication Sig    amoxicillin (AMOXIL) 500 mg capsule Take 1 Capsule by mouth two (2) times a day for 10 days. meloxicam (MOBIC) 15 mg tablet Take 1 Tablet by mouth daily. cholecalciferol (VITAMIN D3) (1000 Units /25 mcg) tablet Take 1 Tablet by mouth daily. ibuprofen (MOTRIN) 400 mg tablet Take 1 Tab by mouth every six (6) hours as needed for Pain. No current facility-administered medications for this visit. Objective:     Vitals:    04/28/23 1219   BP: 109/76   Pulse: (!) 104   Resp: 16   Temp: 98.7 °F (37.1 °C)   SpO2: 100%   Weight: 176 lb (79.8 kg)       Physical Exam  General appearance - appears well hydrated and does not appear toxic, no acute distress  Eyes - EOMs intact. Non injected. No scleral icterus   Ears - no external swelling. TMs normal bilat. Nose - patent. No purulent drainage  Mouth - OP erythema without swelling, exudate or lesion. Mucus membranes moist. Uvula midline. Neck/Lymphatics - trachea midline, full AROM, no LAD of neck  Chest - Normal breathing effort no wheeze rales, rhonchi or diminishments bilaterally. Heart - RRR, no murmurs  Skin - no observable rashes or pallor  Neurologic- alert and oriented x 3  Psychiatric- normal mood, behavior and though content. Assessment/ Plan:     1. Acute streptococcal pharyngitis    - AMB POC STREP A DNA, AMP PROBE  - amoxicillin (AMOXIL) 500 mg capsule; Take 1 Capsule by mouth two (2) times a day for 10 days. Dispense: 20 Capsule; Refill: 0    2. Fever, unspecified fever cause          Rapid strep test result: positive  Please take antibiotic as prescribed  until completion- amoxicillin  OTC Tylenol or Advil as needed for fever/throat pain/discomfort. Maintain adequate fluid intake. Throw out toothbrush after being on antibiotic for 72 hours     Test Results:  Recent Results (from the past 6 hour(s))   AMB POC STREP A DNA, AMP PROBE    Collection Time: 04/28/23 12:26 PM   Result Value Ref Range    VALID INTERNAL CONTROL POC Yes     Group A Strep Ag Positive Negative       Follow up: Follow up immediately for any new, worsening or changes or if symptoms are not improving over the next 5-7 days.          Kelley Martel NP

## 2024-09-02 ENCOUNTER — APPOINTMENT (OUTPATIENT)
Facility: HOSPITAL | Age: 34
End: 2024-09-02
Payer: COMMERCIAL

## 2024-09-02 ENCOUNTER — HOSPITAL ENCOUNTER (EMERGENCY)
Facility: HOSPITAL | Age: 34
Discharge: HOME | End: 2024-09-02
Attending: STUDENT IN AN ORGANIZED HEALTH CARE EDUCATION/TRAINING PROGRAM
Payer: COMMERCIAL

## 2024-09-02 VITALS
HEART RATE: 62 BPM | RESPIRATION RATE: 14 BRPM | OXYGEN SATURATION: 99 % | WEIGHT: 204.37 LBS | HEIGHT: 66 IN | SYSTOLIC BLOOD PRESSURE: 103 MMHG | BODY MASS INDEX: 32.84 KG/M2 | TEMPERATURE: 98.1 F | DIASTOLIC BLOOD PRESSURE: 71 MMHG

## 2024-09-02 DIAGNOSIS — R21 RASH AND OTHER NONSPECIFIC SKIN ERUPTION: ICD-10-CM

## 2024-09-02 DIAGNOSIS — L50.9 URTICARIA: ICD-10-CM

## 2024-09-02 DIAGNOSIS — R80.9 PROTEINURIA, UNSPECIFIED TYPE: Primary | ICD-10-CM

## 2024-09-02 LAB
ALBUMIN SERPL-MCNC: 3.2 G/DL (ref 3.5–5)
ALBUMIN/GLOB SERPL: 0.8 (ref 1.1–2.2)
ALP SERPL-CCNC: 70 U/L (ref 45–117)
ALT SERPL-CCNC: 14 U/L (ref 12–78)
ANION GAP SERPL CALC-SCNC: 2 MMOL/L (ref 5–15)
APPEARANCE UR: CLEAR
AST SERPL-CCNC: 11 U/L (ref 15–37)
BACTERIA URNS QL MICRO: NEGATIVE /HPF
BASOPHILS # BLD: 0 K/UL (ref 0–0.1)
BASOPHILS NFR BLD: 0 % (ref 0–1)
BILIRUB SERPL-MCNC: 0.5 MG/DL (ref 0.2–1)
BILIRUB UR QL: NEGATIVE
BUN SERPL-MCNC: 8 MG/DL (ref 6–20)
BUN/CREAT SERPL: 13 (ref 12–20)
CALCIUM SERPL-MCNC: 8.7 MG/DL (ref 8.5–10.1)
CHLORIDE SERPL-SCNC: 106 MMOL/L (ref 97–108)
CO2 SERPL-SCNC: 29 MMOL/L (ref 21–32)
COLOR UR: ABNORMAL
COMMENT:: NORMAL
CREAT SERPL-MCNC: 0.63 MG/DL (ref 0.55–1.02)
CRP SERPL-MCNC: 13.8 MG/DL (ref 0–0.3)
DIFFERENTIAL METHOD BLD: ABNORMAL
EOSINOPHIL # BLD: 0.1 K/UL (ref 0–0.4)
EOSINOPHIL NFR BLD: 1 % (ref 0–7)
EPITH CASTS URNS QL MICRO: ABNORMAL /LPF
ERYTHROCYTE [DISTWIDTH] IN BLOOD BY AUTOMATED COUNT: 13.1 % (ref 11.5–14.5)
ERYTHROCYTE [SEDIMENTATION RATE] IN BLOOD: 24 MM/HR (ref 0–20)
GLOBULIN SER CALC-MCNC: 4 G/DL (ref 2–4)
GLUCOSE SERPL-MCNC: 102 MG/DL (ref 65–100)
GLUCOSE UR STRIP.AUTO-MCNC: NEGATIVE MG/DL
HCG UR QL: NEGATIVE
HCT VFR BLD AUTO: 39.9 % (ref 35–47)
HGB BLD-MCNC: 13.1 G/DL (ref 11.5–16)
HGB UR QL STRIP: ABNORMAL
HYALINE CASTS URNS QL MICRO: ABNORMAL /LPF (ref 0–5)
IMM GRANULOCYTES # BLD AUTO: 0 K/UL (ref 0–0.04)
IMM GRANULOCYTES NFR BLD AUTO: 0 % (ref 0–0.5)
KETONES UR QL STRIP.AUTO: ABNORMAL MG/DL
LEUKOCYTE ESTERASE UR QL STRIP.AUTO: NEGATIVE
LIPASE SERPL-CCNC: 18 U/L (ref 13–75)
LYMPHOCYTES # BLD: 1.3 K/UL (ref 0.8–3.5)
LYMPHOCYTES NFR BLD: 17 % (ref 12–49)
MCH RBC QN AUTO: 27.6 PG (ref 26–34)
MCHC RBC AUTO-ENTMCNC: 32.8 G/DL (ref 30–36.5)
MCV RBC AUTO: 84 FL (ref 80–99)
MONOCYTES # BLD: 0.2 K/UL (ref 0–1)
MONOCYTES NFR BLD: 2 % (ref 5–13)
NEUTS SEG # BLD: 6.4 K/UL (ref 1.8–8)
NEUTS SEG NFR BLD: 80 % (ref 32–75)
NITRITE UR QL STRIP.AUTO: NEGATIVE
NRBC # BLD: 0 K/UL (ref 0–0.01)
NRBC BLD-RTO: 0 PER 100 WBC
PH UR STRIP: 6 (ref 5–8)
PLATELET # BLD AUTO: 342 K/UL (ref 150–400)
PMV BLD AUTO: 9.7 FL (ref 8.9–12.9)
POTASSIUM SERPL-SCNC: 3.2 MMOL/L (ref 3.5–5.1)
PROT SERPL-MCNC: 7.2 G/DL (ref 6.4–8.2)
PROT UR STRIP-MCNC: 30 MG/DL
RBC # BLD AUTO: 4.75 M/UL (ref 3.8–5.2)
RBC #/AREA URNS HPF: ABNORMAL /HPF (ref 0–5)
SODIUM SERPL-SCNC: 137 MMOL/L (ref 136–145)
SP GR UR REFRACTOMETRY: >1.03
SPECIMEN HOLD: NORMAL
URINE CULTURE IF INDICATED: ABNORMAL
UROBILINOGEN UR QL STRIP.AUTO: 1 EU/DL (ref 0.2–1)
WBC # BLD AUTO: 8.1 K/UL (ref 3.6–11)
WBC URNS QL MICRO: ABNORMAL /HPF (ref 0–4)

## 2024-09-02 PROCEDURE — 83690 ASSAY OF LIPASE: CPT

## 2024-09-02 PROCEDURE — 76770 US EXAM ABDO BACK WALL COMP: CPT

## 2024-09-02 PROCEDURE — 99284 EMERGENCY DEPT VISIT MOD MDM: CPT

## 2024-09-02 PROCEDURE — 85025 COMPLETE CBC W/AUTO DIFF WBC: CPT

## 2024-09-02 PROCEDURE — 81025 URINE PREGNANCY TEST: CPT

## 2024-09-02 PROCEDURE — 85652 RBC SED RATE AUTOMATED: CPT

## 2024-09-02 PROCEDURE — 86140 C-REACTIVE PROTEIN: CPT

## 2024-09-02 PROCEDURE — 81001 URINALYSIS AUTO W/SCOPE: CPT

## 2024-09-02 PROCEDURE — 76705 ECHO EXAM OF ABDOMEN: CPT

## 2024-09-02 PROCEDURE — 6370000000 HC RX 637 (ALT 250 FOR IP): Performed by: EMERGENCY MEDICINE

## 2024-09-02 PROCEDURE — 80053 COMPREHEN METABOLIC PANEL: CPT

## 2024-09-02 PROCEDURE — 36415 COLL VENOUS BLD VENIPUNCTURE: CPT

## 2024-09-02 RX ORDER — PREDNISONE 50 MG/1
50 TABLET ORAL DAILY
Qty: 5 TABLET | Refills: 0 | Status: SHIPPED | OUTPATIENT
Start: 2024-09-02 | End: 2024-09-07

## 2024-09-02 RX ORDER — PREDNISONE 20 MG/1
50 TABLET ORAL
Status: COMPLETED | OUTPATIENT
Start: 2024-09-02 | End: 2024-09-02

## 2024-09-02 RX ADMIN — PREDNISONE 50 MG: 20 TABLET ORAL at 17:01

## 2024-09-02 ASSESSMENT — PAIN DESCRIPTION - PAIN TYPE
TYPE: ACUTE PAIN
TYPE: ACUTE PAIN

## 2024-09-02 ASSESSMENT — PAIN DESCRIPTION - LOCATION: LOCATION: ARM;FACE

## 2024-09-02 ASSESSMENT — PAIN DESCRIPTION - ORIENTATION
ORIENTATION: RIGHT;LEFT
ORIENTATION: RIGHT;LEFT

## 2024-09-02 ASSESSMENT — PAIN DESCRIPTION - FREQUENCY
FREQUENCY: CONTINUOUS
FREQUENCY: CONTINUOUS

## 2024-09-02 ASSESSMENT — PAIN DESCRIPTION - DESCRIPTORS
DESCRIPTORS: ACHING;ITCHING
DESCRIPTORS: ACHING;ITCHING

## 2024-09-02 ASSESSMENT — PAIN SCALES - GENERAL
PAINLEVEL_OUTOF10: 0
PAINLEVEL_OUTOF10: 8
PAINLEVEL_OUTOF10: 7

## 2024-09-02 ASSESSMENT — PAIN - FUNCTIONAL ASSESSMENT
PAIN_FUNCTIONAL_ASSESSMENT: 0-10
PAIN_FUNCTIONAL_ASSESSMENT: ACTIVITIES ARE NOT PREVENTED
PAIN_FUNCTIONAL_ASSESSMENT: NONE - DENIES PAIN

## 2024-09-02 ASSESSMENT — PAIN DESCRIPTION - ONSET
ONSET: ON-GOING
ONSET: ON-GOING

## 2024-09-02 NOTE — H&P
History and Physical    Date of Service:  9/2/2024  Primary Care Provider: Kodi Gutierres MD  Source of information: {HISTORY SOURCE:754680550}    Chief Complaint: Rash      History of Presenting Illness:   Floresita Paz is a 34 y.o. female     In the ED, she was tachycardic to 108 on arrival, improvement to 72 with no intervention.  Other vitals are stable.  Labs show UA with mild proteinuria, trace ketones, and moderate blood with 20-50 RBCs\.  Right upper quadrant ultrasound with no acute abnormalities.  Renal ultrasound ordered, with results pending.       REVIEW OF SYSTEMS:  {Ros - complete:85972569}     Past Medical History:   Diagnosis Date    Vitamin D deficiency 11/27/2020      Past Surgical History:   Procedure Laterality Date    APPENDECTOMY       Prior to Admission medications    Medication Sig Start Date End Date Taking? Authorizing Provider   vitamin D (CHOLECALCIFEROL) 25 MCG (1000 UT) TABS tablet Take 1,000 Units by mouth daily 12/10/21   Automatic Reconciliation, Ar   ibuprofen (ADVIL;MOTRIN) 400 MG tablet Take 400 mg by mouth every 6 hours as needed 12/2/17   Automatic Reconciliation, Ar   meloxicam (MOBIC) 15 MG tablet Take 15 mg by mouth daily 9/13/22   Automatic Reconciliation, Ar     No Known Allergies   Family History   Problem Relation Age of Onset    No Known Problems Mother     No Known Problems Father       Social History:  reports that she has never smoked. She has never used smokeless tobacco. She reports that she does not drink alcohol and does not use drugs.   Social Determinants of Health     Tobacco Use: Low Risk  (4/28/2023)    Received from [facility]  (prior to 6/17/2023)    Patient History     Smoking Tobacco Use: Never     Smokeless Tobacco Use: Never     Passive Exposure: Not on file   Alcohol Use: Not on file   Financial Resource Strain: Not on file   Food Insecurity: Not on file   Transportation Needs: Not on file   Physical Activity: Not on

## 2024-09-02 NOTE — ED NOTES
Patient is been seen and evaluated by the admitting service who feel that the patient can be discharged home.  Please see their note.  This time at the request, patient is discharged home with a dose of steroids here, prescription for steroids, PCP list and return instruction     Len Salas, DO  09/02/24 1651

## 2024-09-02 NOTE — ED TRIAGE NOTES
Pt arrives ambulatory to triage [facility] c/o rash to L side of neck, back, and arms. Pt states roughly 10 days ago she was having pain in her back and abd and then she notced the rash. Pt was seen in another ER and given cream with no relief

## 2024-09-02 NOTE — ED PROVIDER NOTES
skin eruption            DISPOSITION/PLAN   DISPOSITION Decision To Admit 09/02/2024 03:12:24 PM      PATIENT REFERRED TO:  No follow-up provider specified.    DISCHARGE MEDICATIONS:  New Prescriptions    No medications on file         (Please note that portions of this note were completed with a voice recognition program.  Efforts were made to edit the dictations but occasionally words are mis-transcribed.)    Saira Reis MD (electronically signed)  Emergency Attending Physician               Saira Reis MD  09/03/24 [id number]

## 2024-09-09 ENCOUNTER — HOSPITAL ENCOUNTER (EMERGENCY)
Facility: HOSPITAL | Age: 34
Discharge: HOME | End: 2024-09-09
Attending: EMERGENCY MEDICINE
Payer: COMMERCIAL

## 2024-09-09 VITALS
TEMPERATURE: 97.8 F | DIASTOLIC BLOOD PRESSURE: 83 MMHG | OXYGEN SATURATION: 96 % | HEART RATE: 93 BPM | SYSTOLIC BLOOD PRESSURE: 118 MMHG | BODY MASS INDEX: 32.56 KG/M2 | WEIGHT: 202.6 LBS | RESPIRATION RATE: 18 BRPM | HEIGHT: 66 IN

## 2024-09-09 DIAGNOSIS — L50.9 URTICARIA: Primary | ICD-10-CM

## 2024-09-09 LAB
ALBUMIN SERPL-MCNC: 3.4 G/DL (ref 3.5–5)
ALBUMIN/GLOB SERPL: 0.9 (ref 1.1–2.2)
ALP SERPL-CCNC: 74 U/L (ref 45–117)
ALT SERPL-CCNC: 21 U/L (ref 12–78)
ANION GAP SERPL CALC-SCNC: 4 MMOL/L (ref 2–12)
APPEARANCE UR: CLEAR
AST SERPL-CCNC: 5 U/L (ref 15–37)
BACTERIA URNS QL MICRO: NEGATIVE /HPF
BASOPHILS # BLD: 0 K/UL (ref 0–0.1)
BASOPHILS NFR BLD: 0 % (ref 0–1)
BILIRUB SERPL-MCNC: 0.6 MG/DL (ref 0.2–1)
BILIRUB UR QL: NEGATIVE
BUN SERPL-MCNC: 12 MG/DL (ref 6–20)
BUN/CREAT SERPL: 17 (ref 12–20)
CALCIUM SERPL-MCNC: 8.6 MG/DL (ref 8.5–10.1)
CHLORIDE SERPL-SCNC: 103 MMOL/L (ref 97–108)
CO2 SERPL-SCNC: 30 MMOL/L (ref 21–32)
COLOR UR: ABNORMAL
CREAT SERPL-MCNC: 0.7 MG/DL (ref 0.55–1.02)
CRP SERPL-MCNC: 3.7 MG/DL (ref 0–0.3)
DIFFERENTIAL METHOD BLD: ABNORMAL
EOSINOPHIL # BLD: 0.1 K/UL (ref 0–0.4)
EOSINOPHIL NFR BLD: 1 % (ref 0–7)
EPITH CASTS URNS QL MICRO: ABNORMAL /LPF
ERYTHROCYTE [DISTWIDTH] IN BLOOD BY AUTOMATED COUNT: 13.4 % (ref 11.5–14.5)
ERYTHROCYTE [SEDIMENTATION RATE] IN BLOOD: 18 MM/HR (ref 0–20)
GLOBULIN SER CALC-MCNC: 3.9 G/DL (ref 2–4)
GLUCOSE SERPL-MCNC: 97 MG/DL (ref 65–100)
GLUCOSE UR STRIP.AUTO-MCNC: NEGATIVE MG/DL
HCT VFR BLD AUTO: 39.2 % (ref 35–47)
HGB BLD-MCNC: 12.9 G/DL (ref 11.5–16)
HGB UR QL STRIP: ABNORMAL
IMM GRANULOCYTES # BLD AUTO: 0.1 K/UL (ref 0–0.04)
IMM GRANULOCYTES NFR BLD AUTO: 1 % (ref 0–0.5)
KETONES UR QL STRIP.AUTO: NEGATIVE MG/DL
LEUKOCYTE ESTERASE UR QL STRIP.AUTO: NEGATIVE
LYMPHOCYTES # BLD: 2.6 K/UL (ref 0.8–3.5)
LYMPHOCYTES NFR BLD: 24 % (ref 12–49)
MCH RBC QN AUTO: 27.7 PG (ref 26–34)
MCHC RBC AUTO-ENTMCNC: 32.9 G/DL (ref 30–36.5)
MCV RBC AUTO: 84.3 FL (ref 80–99)
MONOCYTES # BLD: 0.5 K/UL (ref 0–1)
MONOCYTES NFR BLD: 5 % (ref 5–13)
MUCOUS THREADS URNS QL MICRO: ABNORMAL /LPF
NEUTS SEG # BLD: 7.3 K/UL (ref 1.8–8)
NEUTS SEG NFR BLD: 69 % (ref 32–75)
NITRITE UR QL STRIP.AUTO: NEGATIVE
NRBC # BLD: 0 K/UL (ref 0–0.01)
NRBC BLD-RTO: 0 PER 100 WBC
PH UR STRIP: 7.5 (ref 5–8)
PLATELET # BLD AUTO: 494 K/UL (ref 150–400)
PMV BLD AUTO: 8.9 FL (ref 8.9–12.9)
POTASSIUM SERPL-SCNC: 4.1 MMOL/L (ref 3.5–5.1)
PROT SERPL-MCNC: 7.3 G/DL (ref 6.4–8.2)
PROT UR STRIP-MCNC: NEGATIVE MG/DL
RBC # BLD AUTO: 4.65 M/UL (ref 3.8–5.2)
RBC #/AREA URNS HPF: ABNORMAL /HPF (ref 0–5)
SODIUM SERPL-SCNC: 137 MMOL/L (ref 136–145)
SP GR UR REFRACTOMETRY: 1.02 (ref 1–1.03)
SPECIMEN HOLD: NORMAL
UROBILINOGEN UR QL STRIP.AUTO: 0.2 EU/DL (ref 0.2–1)
WBC # BLD AUTO: 10.6 K/UL (ref 3.6–11)
WBC URNS QL MICRO: ABNORMAL /HPF (ref 0–4)

## 2024-09-09 PROCEDURE — 36415 COLL VENOUS BLD VENIPUNCTURE: CPT

## 2024-09-09 PROCEDURE — 85025 COMPLETE CBC W/AUTO DIFF WBC: CPT

## 2024-09-09 PROCEDURE — 81001 URINALYSIS AUTO W/SCOPE: CPT

## 2024-09-09 PROCEDURE — 96374 THER/PROPH/DIAG INJ IV PUSH: CPT

## 2024-09-09 PROCEDURE — 85652 RBC SED RATE AUTOMATED: CPT

## 2024-09-09 PROCEDURE — 99284 EMERGENCY DEPT VISIT MOD MDM: CPT

## 2024-09-09 PROCEDURE — 2580000003 HC RX 258

## 2024-09-09 PROCEDURE — 6360000002 HC RX W HCPCS

## 2024-09-09 PROCEDURE — 86140 C-REACTIVE PROTEIN: CPT

## 2024-09-09 PROCEDURE — 80053 COMPREHEN METABOLIC PANEL: CPT

## 2024-09-09 RX ORDER — 0.9 % SODIUM CHLORIDE 0.9 %
1000 INTRAVENOUS SOLUTION INTRAVENOUS ONCE
Status: COMPLETED | OUTPATIENT
Start: 2024-09-09 | End: 2024-09-09

## 2024-09-09 RX ORDER — ONDANSETRON 2 MG/ML
4 INJECTION INTRAMUSCULAR; INTRAVENOUS ONCE
Status: COMPLETED | OUTPATIENT
Start: 2024-09-09 | End: 2024-09-09

## 2024-09-09 RX ORDER — HYDROXYZINE HYDROCHLORIDE 25 MG/1
25 TABLET, FILM COATED ORAL EVERY 8 HOURS PRN
Qty: 30 TABLET | Refills: 0 | Status: SHIPPED | OUTPATIENT
Start: 2024-09-09 | End: 2024-09-19

## 2024-09-09 RX ORDER — METHYLPREDNISOLONE 4 MG
TABLET, DOSE PACK ORAL
Qty: 1 KIT | Refills: 0 | Status: SHIPPED | OUTPATIENT
Start: 2024-09-09 | End: 2024-09-15

## 2024-09-09 RX ORDER — CETIRIZINE HYDROCHLORIDE 10 MG/1
10 TABLET ORAL DAILY
Qty: 14 TABLET | Refills: 0 | Status: SHIPPED | OUTPATIENT
Start: 2024-09-09 | End: 2024-09-23

## 2024-09-09 RX ADMIN — SODIUM CHLORIDE 1000 ML: 9 INJECTION, SOLUTION INTRAVENOUS at 13:00

## 2024-09-09 RX ADMIN — ONDANSETRON 4 MG: 2 INJECTION INTRAMUSCULAR; INTRAVENOUS at 13:01

## 2024-09-09 ASSESSMENT — PAIN SCALES - GENERAL: PAINLEVEL_OUTOF10: 6

## 2024-09-09 ASSESSMENT — PAIN DESCRIPTION - DESCRIPTORS: DESCRIPTORS: ACHING

## 2024-09-09 ASSESSMENT — PAIN - FUNCTIONAL ASSESSMENT
PAIN_FUNCTIONAL_ASSESSMENT: 0-10
PAIN_FUNCTIONAL_ASSESSMENT: PREVENTS OR INTERFERES SOME ACTIVE ACTIVITIES AND ADLS

## 2024-09-09 ASSESSMENT — ENCOUNTER SYMPTOMS
VOMITING: 1
NAUSEA: 1

## 2024-09-09 ASSESSMENT — PAIN DESCRIPTION - ORIENTATION: ORIENTATION: RIGHT;LEFT

## 2024-09-09 ASSESSMENT — PAIN DESCRIPTION - LOCATION: LOCATION: GENERALIZED

## 2024-10-02 ENCOUNTER — OFFICE VISIT (OUTPATIENT)
Age: 34
End: 2024-10-02

## 2024-10-02 VITALS
TEMPERATURE: 98.6 F | SYSTOLIC BLOOD PRESSURE: 91 MMHG | HEIGHT: 62 IN | DIASTOLIC BLOOD PRESSURE: 62 MMHG | RESPIRATION RATE: 16 BRPM | HEART RATE: 66 BPM | WEIGHT: 199.2 LBS | OXYGEN SATURATION: 97 % | BODY MASS INDEX: 36.66 KG/M2

## 2024-10-02 DIAGNOSIS — E66.811 CLASS 1 OBESITY DUE TO EXCESS CALORIES WITHOUT SERIOUS COMORBIDITY WITH BODY MASS INDEX (BMI) OF 32.0 TO 32.9 IN ADULT: Primary | ICD-10-CM

## 2024-10-02 DIAGNOSIS — E55.9 VITAMIN D DEFICIENCY: ICD-10-CM

## 2024-10-02 DIAGNOSIS — R31.9 HEMATURIA, UNSPECIFIED TYPE: ICD-10-CM

## 2024-10-02 DIAGNOSIS — Z76.89 ENCOUNTER TO ESTABLISH CARE: ICD-10-CM

## 2024-10-02 DIAGNOSIS — Z00.00 WELL ADULT EXAM: ICD-10-CM

## 2024-10-02 DIAGNOSIS — E66.09 CLASS 1 OBESITY DUE TO EXCESS CALORIES WITHOUT SERIOUS COMORBIDITY WITH BODY MASS INDEX (BMI) OF 32.0 TO 32.9 IN ADULT: Primary | ICD-10-CM

## 2024-10-02 DIAGNOSIS — L50.9 URTICARIA: ICD-10-CM

## 2024-10-02 DIAGNOSIS — Z97.5 IUD (INTRAUTERINE DEVICE) IN PLACE: ICD-10-CM

## 2024-10-02 LAB
APPEARANCE UR: CLEAR
BACTERIA URNS QL MICRO: NEGATIVE /HPF
BILIRUB UR QL: NEGATIVE
COLOR UR: NORMAL
EPITH CASTS URNS QL MICRO: NORMAL /LPF
GLUCOSE UR STRIP.AUTO-MCNC: NEGATIVE MG/DL
HGB UR QL STRIP: NEGATIVE
KETONES UR QL STRIP.AUTO: NEGATIVE MG/DL
LEUKOCYTE ESTERASE UR QL STRIP.AUTO: NEGATIVE
NITRITE UR QL STRIP.AUTO: NEGATIVE
PH UR STRIP: 8 (ref 5–8)
PROT UR STRIP-MCNC: NEGATIVE MG/DL
RBC #/AREA URNS HPF: NORMAL /HPF (ref 0–5)
SP GR UR REFRACTOMETRY: 1.02 (ref 1–1.03)
UROBILINOGEN UR QL STRIP.AUTO: 1 EU/DL (ref 0.2–1)
WBC URNS QL MICRO: NORMAL /HPF (ref 0–4)

## 2024-10-02 RX ORDER — LEVONORGESTREL 52 MG/1
INTRAUTERINE DEVICE INTRAUTERINE
COMMUNITY
Start: 2019-12-18

## 2024-10-02 SDOH — ECONOMIC STABILITY: INCOME INSECURITY: HOW HARD IS IT FOR YOU TO PAY FOR THE VERY BASICS LIKE FOOD, HOUSING, MEDICAL CARE, AND HEATING?: NOT HARD AT ALL

## 2024-10-02 SDOH — ECONOMIC STABILITY: FOOD INSECURITY: WITHIN THE PAST 12 MONTHS, YOU WORRIED THAT YOUR FOOD WOULD RUN OUT BEFORE YOU GOT MONEY TO BUY MORE.: NEVER TRUE

## 2024-10-02 SDOH — ECONOMIC STABILITY: FOOD INSECURITY: WITHIN THE PAST 12 MONTHS, THE FOOD YOU BOUGHT JUST DIDN'T LAST AND YOU DIDN'T HAVE MONEY TO GET MORE.: NEVER TRUE

## 2024-10-02 ASSESSMENT — PATIENT HEALTH QUESTIONNAIRE - PHQ9
SUM OF ALL RESPONSES TO PHQ9 QUESTIONS 1 & 2: 0
SUM OF ALL RESPONSES TO PHQ QUESTIONS 1-9: 0
1. LITTLE INTEREST OR PLEASURE IN DOING THINGS: NOT AT ALL
SUM OF ALL RESPONSES TO PHQ QUESTIONS 1-9: 0
2. FEELING DOWN, DEPRESSED OR HOPELESS: NOT AT ALL
SUM OF ALL RESPONSES TO PHQ QUESTIONS 1-9: 0
SUM OF ALL RESPONSES TO PHQ QUESTIONS 1-9: 0

## 2024-10-02 ASSESSMENT — ENCOUNTER SYMPTOMS
COUGH: 0
SHORTNESS OF BREATH: 0

## 2024-10-02 NOTE — PROGRESS NOTES
RM:    Chief Complaint   Patient presents with    Establish Care       Fasting No    Vitals:    10/02/24 1410   BP: 91/62   Site: Left Lower Arm   Position: Sitting   Cuff Size: Medium Adult   Pulse: 66   Resp: 16   Temp: 98.6 °F (37 °C)   TempSrc: Oral   SpO2: 97%   Weight: 90.4 kg (199 lb 3.2 oz)   Height: 1.575 m (5' 2\")             No data to display                \"Have you been to the ER, urgent care clinic since your last visit?  Hospitalized since your last visit?\"    YES - When: approximately 1 month ago.  Where and Why: [facility].    “Have you seen or consulted any other health care providers outside of [facility] since your last visit?”    NO     “Have you had a pap smear?”    NO              Click Here for Release of Records Request   AVS  education, follow up, and recommendations provided and addressed with patient.  services used to advise patient no  
Pulses: Normal pulses.      Heart sounds: Normal heart sounds.   Pulmonary:      Effort: Pulmonary effort is normal. No respiratory distress.      Breath sounds: Normal breath sounds.   Abdominal:      General: Abdomen is flat.      Palpations: Abdomen is soft.      Tenderness: There is no abdominal tenderness.   Musculoskeletal:      Right lower leg: No edema.      Left lower leg: No edema.   Skin:     General: Skin is warm and dry.   Neurological:      General: No focal deficit present.      Mental Status: She is alert.   Psychiatric:         Mood and Affect: Mood normal.         Behavior: Behavior normal.         I have reviewed patient medical and social history and medications.  I have reviewed pertinent labs results and other data. I have discussed the diagnosis with the patient and the intended plan as seen in the above orders. The patient has received an after-visit summary and questions were answered concerning future plans. I have discussed medication side effects and warnings with the patient as well.    Richardson Parnell MD  10/02/24

## 2024-10-02 NOTE — PATIENT INSTRUCTIONS
[facility] Fac[facility]  Phone: [phone]  [email or web address]  [address] [facility] [address]  Third floor: Room 315  [place] [id number]     You do not need a referral to see this clinic.  You can schedule appointments online or over the phone.

## 2024-10-04 LAB
25(OH)D3 SERPL-MCNC: 20.5 NG/ML (ref 30–100)
ANION GAP SERPL CALC-SCNC: 0 MMOL/L (ref 2–12)
BUN SERPL-MCNC: 10 MG/DL (ref 6–20)
BUN/CREAT SERPL: 17 (ref 12–20)
CALCIUM SERPL-MCNC: 8.9 MG/DL (ref 8.5–10.1)
CHLORIDE SERPL-SCNC: 109 MMOL/L (ref 97–108)
CHOLEST SERPL-MCNC: 180 MG/DL
CO2 SERPL-SCNC: 29 MMOL/L (ref 21–32)
CREAT SERPL-MCNC: 0.59 MG/DL (ref 0.55–1.02)
EST. AVERAGE GLUCOSE BLD GHB EST-MCNC: 105 MG/DL
GLUCOSE SERPL-MCNC: 104 MG/DL (ref 65–100)
HBA1C MFR BLD: 5.3 % (ref 4–5.6)
HDLC SERPL-MCNC: 56 MG/DL
HDLC SERPL: 3.2 (ref 0–5)
LDLC SERPL CALC-MCNC: 108.2 MG/DL (ref 0–100)
POTASSIUM SERPL-SCNC: 4.3 MMOL/L (ref 3.5–5.1)
SODIUM SERPL-SCNC: 138 MMOL/L (ref 136–145)
TRIGL SERPL-MCNC: 79 MG/DL
VLDLC SERPL CALC-MCNC: 15.8 MG/DL